# Patient Record
Sex: FEMALE | Race: WHITE | NOT HISPANIC OR LATINO | URBAN - METROPOLITAN AREA
[De-identification: names, ages, dates, MRNs, and addresses within clinical notes are randomized per-mention and may not be internally consistent; named-entity substitution may affect disease eponyms.]

---

## 2017-04-11 ENCOUNTER — ON CAMPUS - OUTPATIENT (AMBULATORY)
Dept: URBAN - METROPOLITAN AREA HOSPITAL 85 | Facility: HOSPITAL | Age: 57
End: 2017-04-11

## 2017-04-11 ENCOUNTER — HOSPITAL ENCOUNTER (OUTPATIENT)
Dept: PREOP | Facility: HOSPITAL | Age: 57
Setting detail: HOSPITAL OUTPATIENT SURGERY
Discharge: HOME | End: 2017-04-11
Attending: INTERNAL MEDICINE | Admitting: INTERNAL MEDICINE

## 2017-04-11 DIAGNOSIS — R19.7 DIARRHEA, UNSPECIFIED: ICD-10-CM

## 2017-04-11 DIAGNOSIS — R10.13 EPIGASTRIC PAIN: ICD-10-CM

## 2017-04-11 DIAGNOSIS — Z90.49 ACQUIRED ABSENCE OF OTHER SPECIFIED PARTS OF DIGESTIVE TRACT: ICD-10-CM

## 2017-04-11 DIAGNOSIS — K44.9 DIAPHRAGMATIC HERNIA WITHOUT OBSTRUCTION OR GANGRENE: ICD-10-CM

## 2017-04-11 DIAGNOSIS — R11.0 NAUSEA: ICD-10-CM

## 2017-04-11 DIAGNOSIS — D12.3 BENIGN NEOPLASM OF TRANSVERSE COLON: ICD-10-CM

## 2017-04-11 DIAGNOSIS — K29.70 GASTRITIS, UNSPECIFIED, WITHOUT BLEEDING: ICD-10-CM

## 2017-04-11 DIAGNOSIS — Z85.038 PERSONAL HISTORY OF OTHER MALIGNANT NEOPLASM OF LARGE INTEST: ICD-10-CM

## 2017-04-11 DIAGNOSIS — D12.5 BENIGN NEOPLASM OF SIGMOID COLON: ICD-10-CM

## 2017-04-11 PROCEDURE — 43239 EGD BIOPSY SINGLE/MULTIPLE: CPT | Performed by: INTERNAL MEDICINE

## 2017-04-11 PROCEDURE — 45385 COLONOSCOPY W/LESION REMOVAL: CPT | Performed by: INTERNAL MEDICINE

## 2018-12-17 ENCOUNTER — HOSPITAL ENCOUNTER (OUTPATIENT)
Dept: ORTHOPEDIC SURGERY | Facility: CLINIC | Age: 58
Discharge: HOME | End: 2018-12-17
Attending: NEUROLOGICAL SURGERY | Admitting: NEUROLOGICAL SURGERY

## 2018-12-20 ENCOUNTER — HOSPITAL ENCOUNTER (OUTPATIENT)
Dept: PREADMISSION TESTING | Facility: HOSPITAL | Age: 58
Discharge: HOME | End: 2018-12-20
Attending: NEUROLOGICAL SURGERY | Admitting: NEUROLOGICAL SURGERY

## 2018-12-20 LAB
ABO + RH BLD: NORMAL
ANION GAP SERPL CALC-SCNC: 14.5 MMOL/L (ref 10–20)
APTT BLD: 26.5 SEC (ref 24–31)
ARMBAND: NORMAL
BASOPHILS # BLD AUTO: 0.1 10*3/UL (ref 0–0.2)
BASOPHILS NFR BLD AUTO: 1 % (ref 0–2)
BILIRUB UR QL STRIP: NEGATIVE MG/DL
BLD COMPONENT TYPE: NORMAL
BLD GP AB SCN SERPL QL: NEGATIVE
BUN SERPL-MCNC: 5 MG/DL (ref 8–20)
BUN/CREAT SERPL: 7.1 (ref 5.4–26.2)
CALCIUM SERPL-MCNC: 9.1 MG/DL (ref 8.9–10.3)
CASTS URNS QL MICRO: ABNORMAL /[LPF]
CHLORIDE SERPL-SCNC: 95 MMOL/L (ref 101–111)
COLOR UR: YELLOW
CONV BACTERIA IN URINE MICRO: NEGATIVE
CONV CLARITY OF URINE: CLEAR
CONV CO2: 28 MMOL/L (ref 22–32)
CONV HYALINE CASTS IN URINE MICRO: 0 /[LPF] (ref 0–5)
CONV PROTEIN IN URINE BY AUTOMATED TEST STRIP: NEGATIVE MG/DL
CONV SMALL ROUND CELLS: ABNORMAL /[HPF]
CONV UROBILINOGEN IN URINE BY AUTOMATED TEST STRIP: 0.2 MG/DL
CREAT UR-MCNC: 0.7 MG/DL (ref 0.4–1)
CROSSMATCH EXPIRATION: NORMAL
CULTURE INDICATED?: ABNORMAL
DIFFERENTIAL METHOD BLD: (no result)
EOSINOPHIL # BLD AUTO: 0.1 10*3/UL (ref 0–0.3)
EOSINOPHIL # BLD AUTO: 2 % (ref 0–3)
ERYTHROCYTE [DISTWIDTH] IN BLOOD BY AUTOMATED COUNT: 15.2 % (ref 11.5–14.5)
GLUCOSE SERPL-MCNC: 92 MG/DL (ref 65–99)
GLUCOSE UR QL: NEGATIVE MG/DL
HCT VFR BLD AUTO: 48.6 % (ref 35–49)
HGB BLD-MCNC: 16.1 G/DL (ref 12–15)
HGB UR QL STRIP: ABNORMAL
INR PPP: 1
KETONES UR QL STRIP: NEGATIVE MG/DL
LEUKOCYTE ESTERASE UR QL STRIP: NEGATIVE
LYMPHOCYTES # BLD AUTO: 1.6 10*3/UL (ref 0.8–4.8)
LYMPHOCYTES NFR BLD AUTO: 29 % (ref 18–42)
MCH RBC QN AUTO: 31.8 PG (ref 26–32)
MCHC RBC AUTO-ENTMCNC: 33.2 G/DL (ref 32–36)
MCV RBC AUTO: 95.9 FL (ref 80–94)
MICRO REPORT STATUS: NORMAL
MONOCYTES # BLD AUTO: 0.5 10*3/UL (ref 0.1–1.3)
MONOCYTES NFR BLD AUTO: 9 % (ref 2–11)
NEUTROPHILS # BLD AUTO: 3.2 10*3/UL (ref 2.3–8.6)
NEUTROPHILS NFR BLD AUTO: 59 % (ref 50–75)
NITRITE UR QL STRIP: NEGATIVE
NRBC BLD AUTO-RTO: 0 /100{WBCS}
NRBC/RBC NFR BLD MANUAL: 0 10*3/UL
PH UR STRIP.AUTO: 7 [PH] (ref 4.5–8)
PLATELET # BLD AUTO: 315 10*3/UL (ref 150–450)
PMV BLD AUTO: 6.7 FL (ref 7.4–10.4)
POTASSIUM SERPL-SCNC: 4.5 MMOL/L (ref 3.6–5.1)
PROTHROMBIN TIME: 9.9 SEC (ref 9.6–11.7)
RBC # BLD AUTO: 5.07 10*6/UL (ref 4–5.4)
RBC #/AREA URNS HPF: 3 /[HPF] (ref 0–3)
SODIUM SERPL-SCNC: 133 MMOL/L (ref 136–144)
SP GR UR: 1.01 (ref 1–1.03)
SPECIMEN SOURCE: ABNORMAL
SPERM URNS QL MICRO: ABNORMAL /[HPF]
SQUAMOUS SPT QL MICRO: 1 /[HPF] (ref 0–5)
UNIDENT CRYS URNS QL MICRO: ABNORMAL /[HPF]
WBC # BLD AUTO: 5.6 10*3/UL (ref 4.5–11.5)
WBC #/AREA URNS HPF: 1 /[HPF] (ref 0–5)
YEAST SPEC QL WET PREP: ABNORMAL /[HPF]

## 2018-12-26 ENCOUNTER — HOSPITAL ENCOUNTER (OUTPATIENT)
Dept: PREOP | Facility: HOSPITAL | Age: 58
Setting detail: HOSPITAL OUTPATIENT SURGERY
Discharge: HOME | End: 2018-12-26
Attending: NEUROLOGICAL SURGERY | Admitting: NEUROLOGICAL SURGERY

## 2020-06-09 ENCOUNTER — HOSPITAL ENCOUNTER (INPATIENT)
Facility: HOSPITAL | Age: 60
LOS: 2 days | Discharge: HOME | End: 2020-06-13
Attending: EMERGENCY MEDICINE | Admitting: FAMILY MEDICINE

## 2020-06-09 ENCOUNTER — APPOINTMENT (OUTPATIENT)
Dept: GENERAL RADIOLOGY | Facility: HOSPITAL | Age: 60
End: 2020-06-09

## 2020-06-09 DIAGNOSIS — R94.39 ABNORMAL NUCLEAR STRESS TEST: ICD-10-CM

## 2020-06-09 DIAGNOSIS — I20.8 ANGINA AT REST (HCC): ICD-10-CM

## 2020-06-09 DIAGNOSIS — R06.00 DYSPNEA, UNSPECIFIED TYPE: Primary | ICD-10-CM

## 2020-06-09 DIAGNOSIS — J44.1 COPD EXACERBATION (HCC): ICD-10-CM

## 2020-06-09 PROBLEM — J96.21 ACUTE ON CHRONIC RESPIRATORY FAILURE WITH HYPOXIA: Status: ACTIVE | Noted: 2020-06-09

## 2020-06-09 LAB
ANION GAP SERPL CALCULATED.3IONS-SCNC: 11 MMOL/L (ref 5–15)
BASOPHILS # BLD AUTO: 0 10*3/MM3 (ref 0–0.2)
BASOPHILS NFR BLD AUTO: 0.7 % (ref 0–1.5)
BUN BLD-MCNC: 12 MG/DL (ref 8–23)
BUN BLD-MCNC: ABNORMAL MG/DL
BUN/CREAT SERPL: ABNORMAL
CALCIUM SPEC-SCNC: 8.8 MG/DL (ref 8.6–10.5)
CHLORIDE SERPL-SCNC: 83 MMOL/L (ref 98–107)
CO2 SERPL-SCNC: 33 MMOL/L (ref 22–29)
CREAT BLD-MCNC: 0.53 MG/DL (ref 0.57–1)
DEPRECATED RDW RBC AUTO: 45.5 FL (ref 37–54)
EOSINOPHIL # BLD AUTO: 0.1 10*3/MM3 (ref 0–0.4)
EOSINOPHIL NFR BLD AUTO: 1.4 % (ref 0.3–6.2)
ERYTHROCYTE [DISTWIDTH] IN BLOOD BY AUTOMATED COUNT: 14.7 % (ref 12.3–15.4)
GFR SERPL CREATININE-BSD FRML MDRD: 117 ML/MIN/1.73
GLUCOSE BLD-MCNC: 100 MG/DL (ref 65–99)
HCT VFR BLD AUTO: 51.1 % (ref 34–46.6)
HGB BLD-MCNC: 17.5 G/DL (ref 12–15.9)
LYMPHOCYTES # BLD AUTO: 1.3 10*3/MM3 (ref 0.7–3.1)
LYMPHOCYTES NFR BLD AUTO: 25.3 % (ref 19.6–45.3)
MCH RBC QN AUTO: 31 PG (ref 26.6–33)
MCHC RBC AUTO-ENTMCNC: 34.3 G/DL (ref 31.5–35.7)
MCV RBC AUTO: 90.4 FL (ref 79–97)
MONOCYTES # BLD AUTO: 0.8 10*3/MM3 (ref 0.1–0.9)
MONOCYTES NFR BLD AUTO: 15.3 % (ref 5–12)
NEUTROPHILS # BLD AUTO: 3 10*3/MM3 (ref 1.7–7)
NEUTROPHILS NFR BLD AUTO: 57.3 % (ref 42.7–76)
NRBC BLD AUTO-RTO: 0.1 /100 WBC (ref 0–0.2)
PLATELET # BLD AUTO: 233 10*3/MM3 (ref 140–450)
PMV BLD AUTO: 6.9 FL (ref 6–12)
POTASSIUM BLD-SCNC: 4 MMOL/L (ref 3.5–5.2)
RBC # BLD AUTO: 5.66 10*6/MM3 (ref 3.77–5.28)
SODIUM BLD-SCNC: 127 MMOL/L (ref 136–145)
WBC NRBC COR # BLD: 5.3 10*3/MM3 (ref 3.4–10.8)

## 2020-06-09 PROCEDURE — 25010000002 METHYLPREDNISOLONE PER 125 MG: Performed by: EMERGENCY MEDICINE

## 2020-06-09 PROCEDURE — 82803 BLOOD GASES ANY COMBINATION: CPT

## 2020-06-09 PROCEDURE — 99219 PR INITIAL OBSERVATION CARE/DAY 50 MINUTES: CPT | Performed by: NURSE PRACTITIONER

## 2020-06-09 PROCEDURE — 94799 UNLISTED PULMONARY SVC/PX: CPT

## 2020-06-09 PROCEDURE — G0378 HOSPITAL OBSERVATION PER HR: HCPCS

## 2020-06-09 PROCEDURE — 99284 EMERGENCY DEPT VISIT MOD MDM: CPT

## 2020-06-09 PROCEDURE — 71045 X-RAY EXAM CHEST 1 VIEW: CPT

## 2020-06-09 PROCEDURE — 80048 BASIC METABOLIC PNL TOTAL CA: CPT | Performed by: EMERGENCY MEDICINE

## 2020-06-09 PROCEDURE — 85025 COMPLETE CBC W/AUTO DIFF WBC: CPT | Performed by: EMERGENCY MEDICINE

## 2020-06-09 PROCEDURE — 36600 WITHDRAWAL OF ARTERIAL BLOOD: CPT

## 2020-06-09 PROCEDURE — 93005 ELECTROCARDIOGRAM TRACING: CPT | Performed by: EMERGENCY MEDICINE

## 2020-06-09 PROCEDURE — 94640 AIRWAY INHALATION TREATMENT: CPT

## 2020-06-09 RX ORDER — DICYCLOMINE HCL 20 MG
20 TABLET ORAL EVERY 6 HOURS
COMMUNITY

## 2020-06-09 RX ORDER — IPRATROPIUM BROMIDE AND ALBUTEROL SULFATE 2.5; .5 MG/3ML; MG/3ML
3 SOLUTION RESPIRATORY (INHALATION) ONCE
Status: COMPLETED | OUTPATIENT
Start: 2020-06-09 | End: 2020-06-09

## 2020-06-09 RX ORDER — ATORVASTATIN CALCIUM 20 MG/1
20 TABLET, FILM COATED ORAL DAILY
COMMUNITY

## 2020-06-09 RX ORDER — IPRATROPIUM BROMIDE AND ALBUTEROL SULFATE 2.5; .5 MG/3ML; MG/3ML
3 SOLUTION RESPIRATORY (INHALATION) EVERY 4 HOURS PRN
COMMUNITY

## 2020-06-09 RX ORDER — MIRTAZAPINE 30 MG/1
30 TABLET, FILM COATED ORAL NIGHTLY
COMMUNITY

## 2020-06-09 RX ORDER — SODIUM CHLORIDE 0.9 % (FLUSH) 0.9 %
10 SYRINGE (ML) INJECTION AS NEEDED
Status: DISCONTINUED | OUTPATIENT
Start: 2020-06-09 | End: 2020-06-13 | Stop reason: HOSPADM

## 2020-06-09 RX ORDER — GUAIFENESIN 600 MG/1
1200 TABLET, EXTENDED RELEASE ORAL EVERY 12 HOURS
Status: DISCONTINUED | OUTPATIENT
Start: 2020-06-09 | End: 2020-06-13 | Stop reason: HOSPADM

## 2020-06-09 RX ORDER — MELOXICAM 15 MG/1
15 TABLET ORAL DAILY
COMMUNITY
End: 2020-06-13 | Stop reason: HOSPADM

## 2020-06-09 RX ORDER — PAROXETINE HYDROCHLORIDE 20 MG/1
20 TABLET, FILM COATED ORAL EVERY MORNING
COMMUNITY

## 2020-06-09 RX ORDER — IPRATROPIUM BROMIDE AND ALBUTEROL SULFATE 2.5; .5 MG/3ML; MG/3ML
3 SOLUTION RESPIRATORY (INHALATION)
Status: DISCONTINUED | OUTPATIENT
Start: 2020-06-09 | End: 2020-06-13 | Stop reason: HOSPADM

## 2020-06-09 RX ORDER — ESTRADIOL 1 MG/1
1 TABLET ORAL DAILY
COMMUNITY

## 2020-06-09 RX ORDER — METHYLPREDNISOLONE SODIUM SUCCINATE 125 MG/2ML
125 INJECTION, POWDER, LYOPHILIZED, FOR SOLUTION INTRAMUSCULAR; INTRAVENOUS ONCE
Status: COMPLETED | OUTPATIENT
Start: 2020-06-09 | End: 2020-06-09

## 2020-06-09 RX ORDER — METHYLPREDNISOLONE SODIUM SUCCINATE 40 MG/ML
40 INJECTION, POWDER, LYOPHILIZED, FOR SOLUTION INTRAMUSCULAR; INTRAVENOUS EVERY 6 HOURS
Status: DISCONTINUED | OUTPATIENT
Start: 2020-06-10 | End: 2020-06-10

## 2020-06-09 RX ORDER — GUAIFENESIN/DEXTROMETHORPHAN 100-10MG/5
10 SYRUP ORAL EVERY 6 HOURS PRN
Status: DISCONTINUED | OUTPATIENT
Start: 2020-06-09 | End: 2020-06-13 | Stop reason: HOSPADM

## 2020-06-09 RX ORDER — BUDESONIDE 0.5 MG/2ML
0.5 INHALANT ORAL
Status: DISCONTINUED | OUTPATIENT
Start: 2020-06-09 | End: 2020-06-13 | Stop reason: HOSPADM

## 2020-06-09 RX ORDER — ALBUTEROL SULFATE 90 UG/1
2 AEROSOL, METERED RESPIRATORY (INHALATION) EVERY 4 HOURS PRN
COMMUNITY

## 2020-06-09 RX ADMIN — GUAIFENESIN 1200 MG: 600 TABLET, EXTENDED RELEASE ORAL at 23:01

## 2020-06-09 RX ADMIN — IPRATROPIUM BROMIDE AND ALBUTEROL SULFATE 3 ML: .5; 3 SOLUTION RESPIRATORY (INHALATION) at 21:06

## 2020-06-09 RX ADMIN — IPRATROPIUM BROMIDE AND ALBUTEROL SULFATE 3 ML: .5; 3 SOLUTION RESPIRATORY (INHALATION) at 23:47

## 2020-06-09 RX ADMIN — METHYLPREDNISOLONE SODIUM SUCCINATE 125 MG: 125 INJECTION, POWDER, FOR SOLUTION INTRAMUSCULAR; INTRAVENOUS at 20:35

## 2020-06-09 RX ADMIN — BUDESONIDE 0.5 MG: 0.5 INHALANT RESPIRATORY (INHALATION) at 23:47

## 2020-06-10 ENCOUNTER — APPOINTMENT (OUTPATIENT)
Dept: CT IMAGING | Facility: HOSPITAL | Age: 60
End: 2020-06-10

## 2020-06-10 LAB
ANION GAP SERPL CALCULATED.3IONS-SCNC: 13 MMOL/L (ref 5–15)
ARTERIAL PATENCY WRIST A: POSITIVE
ATMOSPHERIC PRESS: ABNORMAL MM[HG]
BASE EXCESS BLDA CALC-SCNC: 8.7 MMOL/L (ref 0–3)
BDY SITE: ABNORMAL
BUN BLD-MCNC: 11 MG/DL (ref 8–23)
BUN BLD-MCNC: ABNORMAL MG/DL
BUN/CREAT SERPL: ABNORMAL
CALCIUM SPEC-SCNC: 9.1 MG/DL (ref 8.6–10.5)
CHLORIDE SERPL-SCNC: 84 MMOL/L (ref 98–107)
CO2 BLDA-SCNC: 37.7 MMOL/L (ref 22–29)
CO2 SERPL-SCNC: 36 MMOL/L (ref 22–29)
CREAT BLD-MCNC: 0.57 MG/DL (ref 0.57–1)
GFR SERPL CREATININE-BSD FRML MDRD: 108 ML/MIN/1.73
GLUCOSE BLD-MCNC: 244 MG/DL (ref 65–99)
HCO3 BLDA-SCNC: 36 MMOL/L (ref 21–28)
HEMODILUTION: NO
HOROWITZ INDEX BLD+IHG-RTO: 28 %
MODALITY: ABNORMAL
PCO2 BLDA: 55 MM HG (ref 35–48)
PH BLDA: 7.42 PH UNITS (ref 7.35–7.45)
PO2 BLDA: 60.5 MM HG (ref 83–108)
POTASSIUM BLD-SCNC: 3.9 MMOL/L (ref 3.5–5.2)
SAO2 % BLDCOA: 90.6 % (ref 94–98)
SODIUM BLD-SCNC: 133 MMOL/L (ref 136–145)
TROPONIN T SERPL-MCNC: <0.01 NG/ML (ref 0–0.03)
TROPONIN T SERPL-MCNC: <0.01 NG/ML (ref 0–0.03)

## 2020-06-10 PROCEDURE — 71275 CT ANGIOGRAPHY CHEST: CPT

## 2020-06-10 PROCEDURE — 84484 ASSAY OF TROPONIN QUANT: CPT | Performed by: FAMILY MEDICINE

## 2020-06-10 PROCEDURE — 94640 AIRWAY INHALATION TREATMENT: CPT

## 2020-06-10 PROCEDURE — 99226 PR SBSQ OBSERVATION CARE/DAY 35 MINUTES: CPT | Performed by: FAMILY MEDICINE

## 2020-06-10 PROCEDURE — 80048 BASIC METABOLIC PNL TOTAL CA: CPT | Performed by: FAMILY MEDICINE

## 2020-06-10 PROCEDURE — G0378 HOSPITAL OBSERVATION PER HR: HCPCS

## 2020-06-10 PROCEDURE — 94799 UNLISTED PULMONARY SVC/PX: CPT

## 2020-06-10 PROCEDURE — 84484 ASSAY OF TROPONIN QUANT: CPT | Performed by: NURSE PRACTITIONER

## 2020-06-10 PROCEDURE — 25010000002 METHYLPREDNISOLONE PER 40 MG: Performed by: NURSE PRACTITIONER

## 2020-06-10 PROCEDURE — 0 IOPAMIDOL PER 1 ML: Performed by: FAMILY MEDICINE

## 2020-06-10 RX ORDER — ALBUTEROL SULFATE 2.5 MG/3ML
2.5 SOLUTION RESPIRATORY (INHALATION) EVERY 4 HOURS PRN
Status: DISCONTINUED | OUTPATIENT
Start: 2020-06-10 | End: 2020-06-13 | Stop reason: HOSPADM

## 2020-06-10 RX ORDER — MELOXICAM 15 MG/1
15 TABLET ORAL DAILY
Status: DISCONTINUED | OUTPATIENT
Start: 2020-06-10 | End: 2020-06-10

## 2020-06-10 RX ORDER — PREDNISONE 20 MG/1
40 TABLET ORAL
Status: DISCONTINUED | OUTPATIENT
Start: 2020-06-11 | End: 2020-06-13 | Stop reason: HOSPADM

## 2020-06-10 RX ORDER — DICYCLOMINE HYDROCHLORIDE 10 MG/1
10 CAPSULE ORAL 4 TIMES DAILY
Status: DISCONTINUED | OUTPATIENT
Start: 2020-06-10 | End: 2020-06-13 | Stop reason: HOSPADM

## 2020-06-10 RX ORDER — IPRATROPIUM BROMIDE AND ALBUTEROL SULFATE 2.5; .5 MG/3ML; MG/3ML
3 SOLUTION RESPIRATORY (INHALATION) EVERY 4 HOURS PRN
Status: DISCONTINUED | OUTPATIENT
Start: 2020-06-10 | End: 2020-06-13 | Stop reason: HOSPADM

## 2020-06-10 RX ORDER — ATORVASTATIN CALCIUM 20 MG/1
20 TABLET, FILM COATED ORAL DAILY
Status: DISCONTINUED | OUTPATIENT
Start: 2020-06-10 | End: 2020-06-13 | Stop reason: HOSPADM

## 2020-06-10 RX ORDER — HYDROXYZINE HYDROCHLORIDE 25 MG/1
50 TABLET, FILM COATED ORAL 3 TIMES DAILY PRN
Status: DISCONTINUED | OUTPATIENT
Start: 2020-06-10 | End: 2020-06-13 | Stop reason: HOSPADM

## 2020-06-10 RX ORDER — ESTRADIOL 1 MG/1
1 TABLET ORAL DAILY
Status: DISCONTINUED | OUTPATIENT
Start: 2020-06-10 | End: 2020-06-10

## 2020-06-10 RX ORDER — PAROXETINE HYDROCHLORIDE 20 MG/1
20 TABLET, FILM COATED ORAL EVERY MORNING
Status: DISCONTINUED | OUTPATIENT
Start: 2020-06-10 | End: 2020-06-13 | Stop reason: HOSPADM

## 2020-06-10 RX ORDER — MIRTAZAPINE 15 MG/1
30 TABLET, FILM COATED ORAL NIGHTLY
Status: DISCONTINUED | OUTPATIENT
Start: 2020-06-10 | End: 2020-06-13 | Stop reason: HOSPADM

## 2020-06-10 RX ADMIN — PAROXETINE HYDROCHLORIDE 20 MG: 20 TABLET, FILM COATED ORAL at 06:04

## 2020-06-10 RX ADMIN — HYDROXYZINE HYDROCHLORIDE 50 MG: 25 TABLET, FILM COATED ORAL at 20:09

## 2020-06-10 RX ADMIN — MIRTAZAPINE 30 MG: 15 TABLET, FILM COATED ORAL at 20:20

## 2020-06-10 RX ADMIN — GUAIFENESIN 1200 MG: 600 TABLET, EXTENDED RELEASE ORAL at 22:53

## 2020-06-10 RX ADMIN — MIRTAZAPINE 30 MG: 15 TABLET, FILM COATED ORAL at 02:11

## 2020-06-10 RX ADMIN — IPRATROPIUM BROMIDE AND ALBUTEROL SULFATE 3 ML: .5; 3 SOLUTION RESPIRATORY (INHALATION) at 10:20

## 2020-06-10 RX ADMIN — IPRATROPIUM BROMIDE AND ALBUTEROL SULFATE 3 ML: .5; 3 SOLUTION RESPIRATORY (INHALATION) at 15:00

## 2020-06-10 RX ADMIN — DICYCLOMINE HYDROCHLORIDE 10 MG: 10 CAPSULE ORAL at 08:07

## 2020-06-10 RX ADMIN — IPRATROPIUM BROMIDE AND ALBUTEROL SULFATE 3 ML: .5; 3 SOLUTION RESPIRATORY (INHALATION) at 18:54

## 2020-06-10 RX ADMIN — METOPROLOL TARTRATE 25 MG: 25 TABLET, FILM COATED ORAL at 08:07

## 2020-06-10 RX ADMIN — GUAIFENESIN 1200 MG: 600 TABLET, EXTENDED RELEASE ORAL at 11:40

## 2020-06-10 RX ADMIN — IPRATROPIUM BROMIDE AND ALBUTEROL SULFATE 3 ML: .5; 3 SOLUTION RESPIRATORY (INHALATION) at 06:50

## 2020-06-10 RX ADMIN — METHYLPREDNISOLONE SODIUM SUCCINATE 40 MG: 40 INJECTION, POWDER, FOR SOLUTION INTRAMUSCULAR; INTRAVENOUS at 08:07

## 2020-06-10 RX ADMIN — IOPAMIDOL 100 ML: 755 INJECTION, SOLUTION INTRAVENOUS at 17:30

## 2020-06-10 RX ADMIN — DICYCLOMINE HYDROCHLORIDE 10 MG: 10 CAPSULE ORAL at 20:09

## 2020-06-10 RX ADMIN — Medication 10 ML: at 08:07

## 2020-06-10 RX ADMIN — DICYCLOMINE HYDROCHLORIDE 10 MG: 10 CAPSULE ORAL at 11:40

## 2020-06-10 RX ADMIN — METHYLPREDNISOLONE SODIUM SUCCINATE 40 MG: 40 INJECTION, POWDER, FOR SOLUTION INTRAMUSCULAR; INTRAVENOUS at 13:47

## 2020-06-10 RX ADMIN — DICYCLOMINE HYDROCHLORIDE 10 MG: 10 CAPSULE ORAL at 17:48

## 2020-06-10 RX ADMIN — BUDESONIDE 0.5 MG: 0.5 INHALANT RESPIRATORY (INHALATION) at 06:50

## 2020-06-10 RX ADMIN — METHYLPREDNISOLONE SODIUM SUCCINATE 40 MG: 40 INJECTION, POWDER, FOR SOLUTION INTRAMUSCULAR; INTRAVENOUS at 02:11

## 2020-06-10 RX ADMIN — Medication 10 ML: at 13:47

## 2020-06-10 RX ADMIN — ATORVASTATIN CALCIUM 20 MG: 20 TABLET, FILM COATED ORAL at 08:07

## 2020-06-10 RX ADMIN — MELOXICAM 15 MG: 15 TABLET ORAL at 08:07

## 2020-06-10 RX ADMIN — ESTRADIOL 1 MG: 1 TABLET ORAL at 08:07

## 2020-06-10 RX ADMIN — IPRATROPIUM BROMIDE AND ALBUTEROL SULFATE 3 ML: .5; 3 SOLUTION RESPIRATORY (INHALATION) at 23:24

## 2020-06-10 RX ADMIN — Medication 10 ML: at 20:09

## 2020-06-10 RX ADMIN — BUDESONIDE 0.5 MG: 0.5 INHALANT RESPIRATORY (INHALATION) at 18:54

## 2020-06-10 RX ADMIN — GUAIFENESIN AND DEXTROMETHORPHAN 10 ML: 100; 10 SYRUP ORAL at 08:06

## 2020-06-10 NOTE — ED PROVIDER NOTES
Subjective   Patient is a 60-year-old female complaint increasing shortness of breath over the past 1 week.  She has occasional productive cough.  No fever chest pain jaw pain balm diarrhea dysuria or other associated complaints.          Review of Systems  Negative for headache ears throat fever chest pain jaw pain vomiting diarrhea dysuria is weight loss or other associated complaints.  Completely systems was obtained and is otherwise negative  Past Medical History:   Diagnosis Date   • CAD (coronary artery disease)    • Colon cancer (CMS/HCC) 04/13/2016    Colon Cancer Tumors we found on 04/13/2016   • Hyperlipidemia    • Hypertension    • Myocardial infarction (CMS/HCC)        Allergies   Allergen Reactions   • Latex Rash       Past Surgical History:   Procedure Laterality Date   • CORONARY ANGIOPLASTY      Stent Placement   • HYSTERECTOMY     • LUNG SURGERY     • NECK SURGERY     • OTHER SURGICAL HISTORY  12/26/2018    Rm 18 post op right L2-L3 microdiscectomy 12/26/2018       Family History   Problem Relation Age of Onset   • Lung disease Mother         Lung/Respiratory Disease   • Diabetes Father    • Heart disease Father    • Heart disease Sister    • Diabetes Sister        Social History     Socioeconomic History   • Marital status:      Spouse name: Not on file   • Number of children: Not on file   • Years of education: Not on file   • Highest education level: Not on file   Tobacco Use   • Smoking status: Current Every Day Smoker   • Smokeless tobacco: Never Used           Objective   Physical Exam  HEENT exam shows TMs to be clear.  Oropharynx clear moist but sclerae nonicteric.  Neck has no adenopathy JVD or bruits.  Lungs have expiratory wheezes in all lung fields..  Heart has a regular rhythm without murmur gallop.  Chest is nontender.  Abdomen is soft nontender extremity exam is no cyanosis or edema.  Procedures     My EKG interpretation shows normal sinus rhythm with no acute ST change      ED  Course      Results for orders placed or performed during the hospital encounter of 06/09/20   Basic Metabolic Panel   Result Value Ref Range    Glucose 100 (H) 65 - 99 mg/dL    BUN      Creatinine 0.53 (L) 0.57 - 1.00 mg/dL    Sodium 127 (L) 136 - 145 mmol/L    Potassium 4.0 3.5 - 5.2 mmol/L    Chloride 83 (L) 98 - 107 mmol/L    CO2 33.0 (H) 22.0 - 29.0 mmol/L    Calcium 8.8 8.6 - 10.5 mg/dL    eGFR Non African Amer 117 >60 mL/min/1.73    BUN/Creatinine Ratio      Anion Gap 11.0 5.0 - 15.0 mmol/L   CBC Auto Differential   Result Value Ref Range    WBC 5.30 3.40 - 10.80 10*3/mm3    RBC 5.66 (H) 3.77 - 5.28 10*6/mm3    Hemoglobin 17.5 (H) 12.0 - 15.9 g/dL    Hematocrit 51.1 (H) 34.0 - 46.6 %    MCV 90.4 79.0 - 97.0 fL    MCH 31.0 26.6 - 33.0 pg    MCHC 34.3 31.5 - 35.7 g/dL    RDW 14.7 12.3 - 15.4 %    RDW-SD 45.5 37.0 - 54.0 fl    MPV 6.9 6.0 - 12.0 fL    Platelets 233 140 - 450 10*3/mm3    Neutrophil % 57.3 42.7 - 76.0 %    Lymphocyte % 25.3 19.6 - 45.3 %    Monocyte % 15.3 (H) 5.0 - 12.0 %    Eosinophil % 1.4 0.3 - 6.2 %    Basophil % 0.7 0.0 - 1.5 %    Neutrophils, Absolute 3.00 1.70 - 7.00 10*3/mm3    Lymphocytes, Absolute 1.30 0.70 - 3.10 10*3/mm3    Monocytes, Absolute 0.80 0.10 - 0.90 10*3/mm3    Eosinophils, Absolute 0.10 0.00 - 0.40 10*3/mm3    Basophils, Absolute 0.00 0.00 - 0.20 10*3/mm3    nRBC 0.1 0.0 - 0.2 /100 WBC   BUN   Result Value Ref Range    BUN 12 8 - 23 mg/dL     No radiology results for the last day                                       MDM  Number of Diagnoses or Management Options  Diagnosis management comments: Patient has findings consistent with COPD exacerbation with hypoxia.  Patient did improve with breathing treatments and steroids she does have continued extra wheeze on reexam however patient will be admitted for further breathing treatment steroids and further evaluation.  I did speak the on-call hospitalist.    Risk of Complications, Morbidity, and/or Mortality  Presenting  problems: high  Diagnostic procedures: high  Management options: high    Patient Progress  Patient progress: stable      Final diagnoses:   Dyspnea, unspecified type   COPD exacerbation (CMS/[facility])            Dago Ansari MD  06/09/20 1746

## 2020-06-10 NOTE — PLAN OF CARE
Problem: Patient Care Overview  Goal: Plan of Care Review  Flowsheets  Taken 6/10/2020 0250 by Ade Melendez LPN  Progress: no change  Outcome Summary: pt was transfered to the unit from ED. she was admitted for SOB and is on 4L O2, which is new for her. pt has had minimal complaints of pain and is currently resting in bed. will continue to monitor.  Taken 6/9/2020 2306 by Kaleb Joe RN  Plan of Care Reviewed With: patient

## 2020-06-10 NOTE — PROGRESS NOTES
"      [facility] Medicine Services Daily Progress Note      Hospitalist Team  LOS 1 days      Patient Care Team:  Stanley French MD as PCP - General (Family Medicine)    Patient Location: 366/1      Subjective   Subjective     Chief Complaint / Subjective  Chief Complaint   Patient presents with   • Shortness of Breath     RA sat of 74% on EMS arrival, 94% on duoneb currnetly upon arrival      Patient reports feeling much better since initiation of nebulizers and steroids.  Breathing more comfortably and minimal coughing.  Patient reports that she has had some intermittent chest pain as well in the last week along with exertional dyspnea.  Patient has cardiologist but has not seen in multiple years.  When patient on room air after walking to bathroom she desatted into the 70s.    Brief Synopsis of Hospital Course/HPI  60 year old female with PMH COPD and continued tobacco use , CAD s/p PCI presents with complaints of increased shortness of air past few days. She denies nausea, chest pain, dizziness, fever , cough or known Covid-19 exposure. Her oxygen sats were in 70-80s in ED which improved with IV steroids , duo neb treatments and oxygen via nasal canula, CXR per radiology:     \"IMPRESSION:  Stable appearance of the chest. No active cardiopulmonary disease.  Chronic pleural thickening versus small chronic or recurrent pleural  effusions are noted.'     She is afebrile , Hgb 17.5, wbc 5.3, Na 127. EKG and troponin have been subsequently ordered and pending . She will be admitted for COPD exacerbation. PMH includes colon cancer, hyperlipidemia, depression, chronic hypertension, IBS. She denies known Covid-19 exposure .      Date::          Review of Systems   Constitution: Positive for malaise/fatigue. Negative for chills and fever.   HENT: Negative for hoarse voice and stridor.    Eyes: Negative for double vision and photophobia.   Cardiovascular: Positive for chest pain. Negative for " "irregular heartbeat and syncope.   Respiratory: Positive for cough, shortness of breath and wheezing. Negative for sputum production.    Musculoskeletal: Negative for falls and muscle weakness.   Gastrointestinal: Negative for nausea and vomiting.   Genitourinary: Negative for dysuria and flank pain.   Neurological: Negative for headaches and loss of balance.   Psychiatric/Behavioral: Negative for altered mental status. The patient is not nervous/anxious.          Objective   Objective      Vital Signs  Temp:  [97.5 °F (36.4 °C)-98.9 °F (37.2 °C)] 98.9 °F (37.2 °C)  Heart Rate:  [] 76  Resp:  [14-24] 14  BP: (105-164)/(67-96) 126/74  Oxygen Therapy  SpO2: 94 %  Pulse Oximetry Type: Intermittent  Device (Oxygen Therapy): nasal cannula  Device (Oxygen Therapy): nasal cannula  Flow (L/min): 2  Oxygen Concentration (%): 28  Flowsheet Rows      First Filed Value   Admission Height  165.1 cm (65\") Documented at 06/09/2020 2011   Admission Weight  52.2 kg (115 lb) Documented at 06/09/2020 2011        Intake & Output (last 3 days)       06/07 0701 - 06/08 0700 06/08 0701 - 06/09 0700 06/09 0701 - 06/10 0700 06/10 0701 - 06/11 0700    P.O.    400    Total Intake(mL/kg)    400 (7.7)    Net    +400                Lines, Drains & Airways    Active LDAs     Name:   Placement date:   Placement time:   Site:   Days:    Peripheral IV 06/09/20 2026 Left Antecubital   06/09/20 2026    Antecubital   less than 1                  Physical Exam:    Physical Exam    General: Slender elderly female lying in bed breathing comfortably on 2 L via nasal cannula no acute distress  HEENT: NC/AT, EOMI, PERRLA  Heart: RRR. No murmur   Chest: Normal work of breathing, poor air entry  Abdominal: Soft. NT/ND. Bowel sounds present  Musculoskeletal: Normal ROM.  No edema. No calf tenderness.  Neurological: AAOx3, no focal deficits  Skin: Skin is warm and dry. No rash  Psychiatric: Normal mood and affect.        Procedures:              Results " Review:     I reviewed the patient's new clinical results.      Lab Results (last 24 hours)     Procedure Component Value Units Date/Time    BUN [[id number]]  (Normal) Collected:  06/10/20 1153    Specimen:  Blood Updated:  06/10/20 1428     BUN 11 mg/dL     Basic Metabolic Panel [[id number]]  (Abnormal) Collected:  06/10/20 1153    Specimen:  Blood Updated:  06/10/20 1411     Glucose 244 mg/dL      BUN --     Comment: Testing performed by alternate method        Creatinine 0.57 mg/dL      Sodium 133 mmol/L      Potassium 3.9 mmol/L      Chloride 84 mmol/L      CO2 36.0 mmol/L      Calcium 9.1 mg/dL      eGFR Non African Amer 108 mL/min/1.73      BUN/Creatinine Ratio --     Comment: Testing not performed.        Anion Gap 13.0 mmol/L     Narrative:       GFR Normal >60  Chronic Kidney Disease <60  Kidney Failure <15      Troponin [[id number]]  (Normal) Collected:  06/10/20 0202    Specimen:  Blood Updated:  06/10/20 0430     Troponin T <0.010 ng/mL     Narrative:       Troponin T Reference Range:  <= 0.03 ng/mL-   Negative for AMI  >0.03 ng/mL-     Abnormal for myocardial necrosis.  Clinicians would have to utilize clinical acumen, EKG, Troponin and serial changes to determine if it is an Acute Myocardial Infarction or myocardial injury due to an underlying chronic condition.       Results may be falsely decreased if patient taking Biotin.      Basic Metabolic Panel [[id number]]  (Abnormal) Collected:  06/09/20 2029    Specimen:  Blood from Arm, Left Updated:  06/09/20 2120     Glucose 100 mg/dL      BUN --     Comment: Testing done by alternate method.        Creatinine 0.53 mg/dL      Sodium 127 mmol/L      Potassium 4.0 mmol/L      Chloride 83 mmol/L      CO2 33.0 mmol/L      Calcium 8.8 mg/dL      eGFR Non African Amer 117 mL/min/1.73      BUN/Creatinine Ratio --     Comment: Testing not performed.        Anion Gap 11.0 mmol/L     Narrative:       GFR Normal >60  Chronic Kidney Disease <60  Kidney Failure <15       BUN [[id number]]  (Normal) Collected:  06/09/20 2029    Specimen:  Blood from Arm, Left Updated:  06/09/20 2120     BUN 12 mg/dL     CBC & Differential [[id number]] Collected:  06/09/20 2029    Specimen:  Blood from Arm, Left Updated:  06/09/20 2038    Narrative:       The following orders were created for panel order CBC & Differential.  Procedure                               Abnormality         Status                     ---------                               -----------         ------                     CBC Auto Differential[[id number]]        Abnormal            Final result                 Please view results for these tests on the individual orders.    CBC Auto Differential [[id number]]  (Abnormal) Collected:  06/09/20 2029    Specimen:  Blood from Arm, Left Updated:  06/09/20 2038     WBC 5.30 10*3/mm3      RBC 5.66 10*6/mm3      Hemoglobin 17.5 g/dL      Hematocrit 51.1 %      MCV 90.4 fL      MCH 31.0 pg      MCHC 34.3 g/dL      RDW 14.7 %      RDW-SD 45.5 fl      MPV 6.9 fL      Platelets 233 10*3/mm3      Neutrophil % 57.3 %      Lymphocyte % 25.3 %      Monocyte % 15.3 %      Eosinophil % 1.4 %      Basophil % 0.7 %      Neutrophils, Absolute 3.00 10*3/mm3      Lymphocytes, Absolute 1.30 10*3/mm3      Monocytes, Absolute 0.80 10*3/mm3      Eosinophils, Absolute 0.10 10*3/mm3      Basophils, Absolute 0.00 10*3/mm3      nRBC 0.1 /100 WBC         No results found for: HGBA1C            No results found for: LIPASE  No results found for: CHOL, CHLPL, TRIG, HDL, LDL, LDLDIRECT    No results found for: INTRAOP, PREDX, FINALDX, COMDX    Microbiology Results (last 10 days)     ** No results found for the last 240 hours. **          ECG/EMG Results (most recent)     Procedure Component Value Units Date/Time    ECG 12 Lead [[id number]] Collected:  06/09/20 2024     Updated:  06/09/20 2027    Narrative:       HEART RATE= 95  bpm  RR Interval= 632  ms  SC Interval= 122  ms  P Horizontal Axis= -9  deg  P Front Axis= 82   deg  QRSD Interval= 78  ms  QT Interval= 358  ms  QRS Axis= -9  deg  T Wave Axis= 15  deg  - BORDERLINE ECG -  Sinus rhythm  Biatrial enlargement  When compared with ECG of 20-Dec-2018 10:53:52,  Significant change in rhythm  Significant axis, voltage or hypertrophy change  Electronically Signed By:   Date and Time of Study: 2020-06-09 20:24:23                    Xr Chest 1 View    Result Date: 6/9/2020  Stable appearance of the chest. No active cardiopulmonary disease. Chronic pleural thickening versus small chronic or recurrent pleural effusions are noted.   Electronically Signed By-Kyree Black DO. On:6/9/2020 10:29 PM This report was finalized on [id number] by  Kyree Black DO..          Xrays, labs reviewed personally by physician.    Medication Review:   I have reviewed the patient's current medication list      Scheduled Meds    atorvastatin 20 mg Oral Daily   budesonide 0.5 mg Nebulization BID - RT   dicyclomine 10 mg Oral 4x Daily   estradiol 1 mg Oral Daily   guaiFENesin 1,200 mg Oral Q12H   ipratropium-albuterol 3 mL Nebulization Q4H - RT   meloxicam 15 mg Oral Daily   methylPREDNISolone sodium succinate 40 mg Intravenous Q6H   metoprolol tartrate 25 mg Oral Daily   mirtazapine 30 mg Oral Nightly   PARoxetine 20 mg Oral QAM       Meds Infusions       Meds PRN  •  albuterol  •  guaiFENesin-dextromethorphan  •  ipratropium-albuterol  •  ipratropium-albuterol  •  [COMPLETED] Insert peripheral IV **AND** sodium chloride        Assessment/Plan   Assessment/Plan     Active Hospital Problems    Diagnosis  POA   • Dyspnea [R06.00]  Yes   • Acute on chronic respiratory failure with hypoxia (CMS/HCC) [J96.21]  Yes      Resolved Hospital Problems   No resolved problems to display.       MEDICAL DECISION MAKING COMPLEXITY BY PROBLEM:     Shortness of breath -may be multifactorial.  Patient's history of tobacco use and wheezing consistent with COPD exacerbation.  However patient also has cardiac history and  noted chest pain with exertional dyspnea  -Check troponins periodically  -Mucolytic  -Nebulizers  -Steroids  -No sputum present  -Stress test  -Inpatient versus outpatient cardiology follow-up  -Given history of smoking and hormone replacement keep PE on differential    Hyponatremia -possible ADH mediated stress response, improved from admission of 127  -Monitor daily  -Patient appears euvolemic    Polycythemia -secondary most likely due to smoking history  -Monitor daily    Coronary artery disease -with history of MI has not seen cardiology in multiple years  -Check troponins  -Telemetry  -Stress test in a.m.    Hypertension/depression/hormone replacement/IBS -chronic in nature  -Hold hormones  -Resume other medications clinically appropriate     VTE Prophylaxis -   Mechanical Order History:      Ordered        06/09/20 2233  Place Sequential Compression Device  Once         06/09/20 2233  Maintain Sequential Compression Device  Continuous                 Pharmalogical Order History:     None            Code Status -   Code Status and Medical Interventions:   Ordered at: 06/09/20 2154     Code Status:    CPR     Medical Interventions (Level of Support Prior to Arrest):    Full           Discharge Planning          Destination      Coordination has not been started for this encounter.      Durable Medical Equipment      Coordination has not been started for this encounter.      Dialysis/Infusion      Coordination has not been started for this encounter.      Home Medical Care      Coordination has not been started for this encounter.      Therapy      Coordination has not been started for this encounter.      Community Resources      Coordination has not been started for this encounter.            Electronically signed by Peter Eduardo MD, 06/10/20, 15:14.  [facility]ist Team

## 2020-06-10 NOTE — PROGRESS NOTES
Continued Stay Note  [facility]     Patient Name: Jocelin Wills  MRN: [id number]  Today's Date: 6/10/2020    Admit Date: 6/9/2020    Discharge Plan     Row Name 06/10/20 1127       Plan    Plan  Anticipate home with g-son. Grandson is Autistic and is there to help her ,if needed.             Expected Discharge Date and Time     Expected Discharge Date Expected Discharge Time    Jun 12, 2020             Kristi Zhou RN

## 2020-06-10 NOTE — ED NOTES
Pt reports normal diarrhea for her. She had a bowel resection a few years ago and has had diarrhea regularly since.     Maureen Lombardi RN  06/09/20 2041

## 2020-06-10 NOTE — PLAN OF CARE
No complaints. Patient scheduled to have stress test 6/11. Will continue to monitor.  Problem: Patient Care Overview  Goal: Plan of Care Review  Outcome: Ongoing (interventions implemented as appropriate)

## 2020-06-10 NOTE — H&P
"      [facility] Medicine Services      Patient Name: Jocelin Wills  : 1960  MRN: [id number]  Primary Care Physician: Stanley French MD  Date of admission: 2020    Patient Care Team:  Stanley French MD as PCP - General (Family Medicine)          Subjective   History Present Illness     Chief Complaint:   Chief Complaint   Patient presents with   • Shortness of Breath     RA sat of 74% on EMS arrival, 94% on duoneb currnetly upon arrival                 60 year old female with PMH COPD and continued tobacco use , CAD s/p PCI presents with complaints of increased shortness of air past few days. She denies nausea, chest pain, dizziness, fever , cough or known Covid-19 exposure. Her oxygen sats were in 70-80s in ED which improved with IV steroids , duo neb treatments and oxygen via nasal canula, CXR per radiology:    \"IMPRESSION:  Stable appearance of the chest. No active cardiopulmonary disease.  Chronic pleural thickening versus small chronic or recurrent pleural  effusions are noted.'    She is afebrile , Hgb 17.5, wbc 5.3, Na 127. EKG and troponin have been subsequently ordered and pending . She will be admitted for COPD exacerbation. PMH includes colon cancer, hyperlipidemia, depression, chronic hypertension, IBS. She denies known Covid-19 exposure .        Review of Systems   Constitution: Negative.   HENT: Negative.    Eyes: Negative.    Cardiovascular: Negative.    Respiratory: Positive for shortness of breath and wheezing.    Endocrine: Negative.    Hematologic/Lymphatic: Negative.    Skin: Negative.    Musculoskeletal: Negative.    Gastrointestinal: Negative.    Genitourinary: Negative.    Neurological: Negative.    Psychiatric/Behavioral: Positive for depression.   Allergic/Immunologic: Negative.            Personal History     Past Medical History:   Past Medical History:   Diagnosis Date   • CAD (coronary artery disease)    • Colon cancer (CMS/HCC) " 04/13/2016    Colon Cancer Tumors we found on 04/13/2016   • Hyperlipidemia    • Hypertension    • Myocardial infarction (CMS/HCC)        Surgical History:      Past Surgical History:   Procedure Laterality Date   • CORONARY ANGIOPLASTY      Stent Placement   • HYSTERECTOMY     • LUNG SURGERY     • NECK SURGERY     • OTHER SURGICAL HISTORY  12/26/2018    Rm 18 post op right L2-L3 microdiscectomy 12/26/2018           Family History: family history includes Diabetes in her father and sister; Heart disease in her father and sister; Lung disease in her mother. Otherwise pertinent FHx was reviewed and unremarkable.     Social History:  reports that she has been smoking. She has never used smokeless tobacco.      Medications:  Prior to Admission medications    Medication Sig Start Date End Date Taking? Authorizing Provider   albuterol sulfate  (90 Base) MCG/ACT inhaler Inhale 2 puffs Every 4 (Four) Hours As Needed for Wheezing.   Yes Vincent Stevens MD   atorvastatin (LIPITOR) 20 MG tablet Take 20 mg by mouth Daily.   Yes Vincent Stevens MD   dicyclomine (BENTYL) 20 MG tablet Take 20 mg by mouth Every 6 (Six) Hours.   Yes Vincent Stevens MD   estradiol (ESTRACE) 1 MG tablet Take 1 mg by mouth Daily.   Yes ProviderVincent MD   ipratropium-albuterol (DUO-NEB) 0.5-2.5 mg/3 ml nebulizer Take 3 mL by nebulization Every 4 (Four) Hours As Needed for Wheezing.   Yes Vincent Stevens MD   meloxicam (MOBIC) 15 MG tablet Take 15 mg by mouth Daily.   Yes Vincent Stevens MD   metoprolol tartrate (LOPRESSOR) 25 MG tablet Take 25 mg by mouth Daily.   Yes Vincent Stevens MD   mirtazapine (REMERON) 30 MG tablet Take 30 mg by mouth Every Night.   Yes Vincent Stevens MD   PARoxetine (PAXIL) 20 MG tablet Take 20 mg by mouth Every Morning.   Yes ProviderVincent MD       Allergies:    Allergies   Allergen Reactions   • Latex Rash       Objective   Objective     Vital Signs  Temp:   [98.7 °F (37.1 °C)] 98.7 °F (37.1 °C)  Heart Rate:  [] 101  Resp:  [16-24] 22  BP: (131-164)/(85-96) 148/96  SpO2:  [88 %-97 %] 88 %  on  Flow (L/min):  [2] 2;   Device (Oxygen Therapy): nasal cannula  Body mass index is 19.14 kg/m².    Physical Exam   Constitutional: She is oriented to person, place, and time. She appears well-developed and well-nourished.   HENT:   Head: Normocephalic and atraumatic.   Eyes: EOM are normal.   Neck: Normal range of motion. Neck supple.   Cardiovascular: Normal rate, regular rhythm and normal heart sounds.   Pulmonary/Chest: Effort normal and breath sounds normal.   Abdominal: Bowel sounds are normal.   Genitourinary:   Genitourinary Comments: deferred   Musculoskeletal: Normal range of motion.   Neurological: She is alert and oriented to person, place, and time.   Skin: Skin is warm and dry.   Psychiatric: She has a normal mood and affect. Her behavior is normal. Judgment and thought content normal.   Vitals reviewed.      Results Review:  I have personally reviewed most recent radiology images and interpretations and agree with findings, most notably: CXR.    Results from last 7 days   Lab Units 06/09/20 2029   WBC 10*3/mm3 5.30   HEMOGLOBIN g/dL 17.5*   HEMATOCRIT % 51.1*   PLATELETS 10*3/mm3 233     Results from last 7 days   Lab Units 06/09/20 2029   SODIUM mmol/L 127*   POTASSIUM mmol/L 4.0   CHLORIDE mmol/L 83*   CO2 mmol/L 33.0*   BUN  12   CREATININE mg/dL 0.53*   GLUCOSE mg/dL 100*   CALCIUM mg/dL 8.8     Estimated Creatinine Clearance: 91.9 mL/min (A) (by C-G formula based on SCr of 0.53 mg/dL (L)).  Brief Urine Lab Results     None          Microbiology Results (last 10 days)     ** No results found for the last 240 hours. **          ECG/EMG Results (most recent)     Procedure Component Value Units Date/Time    ECG 12 Lead [[id number]] Collected:  06/09/20 2024     Updated:  06/09/20 2027    Narrative:       HEART RATE= 95  bpm  RR Interval= 632  ms  AL Interval=  122  ms  P Horizontal Axis= -9  deg  P Front Axis= 82  deg  QRSD Interval= 78  ms  QT Interval= 358  ms  QRS Axis= -9  deg  T Wave Axis= 15  deg  - BORDERLINE ECG -  Sinus rhythm  Biatrial enlargement  When compared with ECG of 20-Dec-2018 10:53:52,  Significant change in rhythm  Significant axis, voltage or hypertrophy change  Electronically Signed By:   Date and Time of Study: 2020-06-09 20:24:23                    No radiology results for the last 7 days      Estimated Creatinine Clearance: 91.9 mL/min (A) (by C-G formula based on SCr of 0.53 mg/dL (L)).    Assessment/Plan   Assessment/Plan       Active Hospital Problems    Diagnosis  POA   • Dyspnea [R06.00]  Yes      Resolved Hospital Problems   No resolved problems to display.     Dyspnea/ acute resp failure with hypoxia  - likely secondary to COPD exacerbation with continued tobacco use - now improved , continue supplemental oxygen via nasal canula, duo nebs and IV steroids, continue home , albuterol, not on home oxygen , encourage smoking cessation, wbc normal no signs infectious process, afebrile     CAD s/p MI CADs/p PCI- denies chest pain troponin and ekg ordered and pending given PMH, on statin and BB    Essential htn - on metoprolol, monitor     Depression - on mirtazapine and Paxil     HRT- on estradiol    IBS- on Bentyl    Chronic pain - on meloxicam           VTE Prophylaxis -   Mechanical Order History:      Ordered        Signed and Held  Place Sequential Compression Device  Once         Signed and Held  Maintain Sequential Compression Device  Continuous                 Pharmalogical Order History:     None          CODE STATUS:    Code Status and Medical Interventions:   Ordered at: 06/09/20 9222     Code Status:    CPR     Medical Interventions (Level of Support Prior to Arrest):    Full       This patient has been examined wearing appropriate Personal Protective Equipment . 06/09/20      I discussed the patient's findings and my recommendations  with patient.        Electronically signed by TOMER Lomeli, 06/09/20, 9:54 PM.  [facility] Hospitalist Team

## 2020-06-11 ENCOUNTER — APPOINTMENT (OUTPATIENT)
Dept: NUCLEAR MEDICINE | Facility: HOSPITAL | Age: 60
End: 2020-06-11

## 2020-06-11 ENCOUNTER — HOSPITAL ENCOUNTER (OUTPATIENT)
Facility: HOSPITAL | Age: 60
Setting detail: HOSPITAL OUTPATIENT SURGERY
End: 2020-06-11
Attending: INTERNAL MEDICINE | Admitting: INTERNAL MEDICINE

## 2020-06-11 DIAGNOSIS — I20.8 ANGINA AT REST (HCC): ICD-10-CM

## 2020-06-11 DIAGNOSIS — R94.39 ABNORMAL NUCLEAR STRESS TEST: ICD-10-CM

## 2020-06-11 PROBLEM — I20.89 ANGINA AT REST: Status: ACTIVE | Noted: 2020-06-11

## 2020-06-11 LAB
ANION GAP SERPL CALCULATED.3IONS-SCNC: 5 MMOL/L (ref 5–15)
ANION GAP SERPL CALCULATED.3IONS-SCNC: 7 MMOL/L (ref 5–15)
BASOPHILS # BLD AUTO: 0 10*3/MM3 (ref 0–0.2)
BASOPHILS # BLD AUTO: 0 10*3/MM3 (ref 0–0.2)
BASOPHILS NFR BLD AUTO: 0.1 % (ref 0–1.5)
BASOPHILS NFR BLD AUTO: 0.9 % (ref 0–1.5)
BH CV NUCLEAR PRIOR STUDY: 3
BH CV STRESS BP STAGE 1: NORMAL
BH CV STRESS BP STAGE 2: NORMAL
BH CV STRESS COMMENTS STAGE 1: NORMAL
BH CV STRESS COMMENTS STAGE 2: NORMAL
BH CV STRESS DOSE REGADENOSON STAGE 1: 0.4
BH CV STRESS DURATION MIN STAGE 1: 0
BH CV STRESS DURATION MIN STAGE 2: 4
BH CV STRESS DURATION SEC STAGE 1: 10
BH CV STRESS DURATION SEC STAGE 2: 0
BH CV STRESS HR STAGE 1: 86
BH CV STRESS HR STAGE 2: 97
BH CV STRESS PROTOCOL 1: NORMAL
BH CV STRESS RECOVERY BP: NORMAL MMHG
BH CV STRESS RECOVERY HR: 89 BPM
BH CV STRESS STAGE 1: 1
BH CV STRESS STAGE 2: 2
BUN BLD-MCNC: 14 MG/DL (ref 8–23)
BUN BLD-MCNC: ABNORMAL MG/DL
BUN BLD-MCNC: ABNORMAL MG/DL
BUN/CREAT SERPL: ABNORMAL
BUN/CREAT SERPL: ABNORMAL
CALCIUM SPEC-SCNC: 8.9 MG/DL (ref 8.6–10.5)
CALCIUM SPEC-SCNC: 9.6 MG/DL (ref 8.6–10.5)
CHLORIDE SERPL-SCNC: 91 MMOL/L (ref 98–107)
CHLORIDE SERPL-SCNC: 95 MMOL/L (ref 98–107)
CO2 SERPL-SCNC: 37 MMOL/L (ref 22–29)
CO2 SERPL-SCNC: 40 MMOL/L (ref 22–29)
CREAT BLD-MCNC: 0.55 MG/DL (ref 0.57–1)
CREAT BLD-MCNC: 0.62 MG/DL (ref 0.57–1)
DEPRECATED RDW RBC AUTO: 47.7 FL (ref 37–54)
DEPRECATED RDW RBC AUTO: 48.1 FL (ref 37–54)
EOSINOPHIL # BLD AUTO: 0 10*3/MM3 (ref 0–0.4)
EOSINOPHIL # BLD AUTO: 0 10*3/MM3 (ref 0–0.4)
EOSINOPHIL NFR BLD AUTO: 0 % (ref 0.3–6.2)
EOSINOPHIL NFR BLD AUTO: 0 % (ref 0.3–6.2)
ERYTHROCYTE [DISTWIDTH] IN BLOOD BY AUTOMATED COUNT: 14.8 % (ref 12.3–15.4)
ERYTHROCYTE [DISTWIDTH] IN BLOOD BY AUTOMATED COUNT: 14.9 % (ref 12.3–15.4)
GFR SERPL CREATININE-BSD FRML MDRD: 112 ML/MIN/1.73
GFR SERPL CREATININE-BSD FRML MDRD: 98 ML/MIN/1.73
GLUCOSE BLD-MCNC: 104 MG/DL (ref 65–99)
GLUCOSE BLD-MCNC: 188 MG/DL (ref 65–99)
HCT VFR BLD AUTO: 46.3 % (ref 34–46.6)
HCT VFR BLD AUTO: 50.2 % (ref 34–46.6)
HGB BLD-MCNC: 15.5 G/DL (ref 12–15.9)
HGB BLD-MCNC: 16.5 G/DL (ref 12–15.9)
LV EF NUC BP: 50 %
LYMPHOCYTES # BLD AUTO: 0.4 10*3/MM3 (ref 0.7–3.1)
LYMPHOCYTES # BLD AUTO: 0.9 10*3/MM3 (ref 0.7–3.1)
LYMPHOCYTES NFR BLD AUTO: 17.5 % (ref 19.6–45.3)
LYMPHOCYTES NFR BLD AUTO: 7.6 % (ref 19.6–45.3)
MAGNESIUM SERPL-MCNC: 1.6 MG/DL (ref 1.6–2.4)
MAXIMAL PREDICTED HEART RATE: 159 BPM
MCH RBC QN AUTO: 30.2 PG (ref 26.6–33)
MCH RBC QN AUTO: 31.2 PG (ref 26.6–33)
MCHC RBC AUTO-ENTMCNC: 32.9 G/DL (ref 31.5–35.7)
MCHC RBC AUTO-ENTMCNC: 33.6 G/DL (ref 31.5–35.7)
MCV RBC AUTO: 92 FL (ref 79–97)
MCV RBC AUTO: 92.9 FL (ref 79–97)
MONOCYTES # BLD AUTO: 0.5 10*3/MM3 (ref 0.1–0.9)
MONOCYTES # BLD AUTO: 0.8 10*3/MM3 (ref 0.1–0.9)
MONOCYTES NFR BLD AUTO: 14.4 % (ref 5–12)
MONOCYTES NFR BLD AUTO: 8.8 % (ref 5–12)
NEUTROPHILS # BLD AUTO: 3.5 10*3/MM3 (ref 1.7–7)
NEUTROPHILS # BLD AUTO: 4.6 10*3/MM3 (ref 1.7–7)
NEUTROPHILS NFR BLD AUTO: 67.2 % (ref 42.7–76)
NEUTROPHILS NFR BLD AUTO: 83.5 % (ref 42.7–76)
NRBC BLD AUTO-RTO: 0 /100 WBC (ref 0–0.2)
NRBC BLD AUTO-RTO: 0.1 /100 WBC (ref 0–0.2)
PERCENT MAX PREDICTED HR: 61.01 %
PLATELET # BLD AUTO: 243 10*3/MM3 (ref 140–450)
PLATELET # BLD AUTO: 249 10*3/MM3 (ref 140–450)
PMV BLD AUTO: 6.9 FL (ref 6–12)
PMV BLD AUTO: 6.9 FL (ref 6–12)
POTASSIUM BLD-SCNC: 4.4 MMOL/L (ref 3.5–5.2)
POTASSIUM BLD-SCNC: 4.5 MMOL/L (ref 3.5–5.2)
RBC # BLD AUTO: 4.98 10*6/MM3 (ref 3.77–5.28)
RBC # BLD AUTO: 5.46 10*6/MM3 (ref 3.77–5.28)
SODIUM BLD-SCNC: 136 MMOL/L (ref 136–145)
SODIUM BLD-SCNC: 139 MMOL/L (ref 136–145)
STRESS BASELINE BP: NORMAL MMHG
STRESS BASELINE HR: 86 BPM
STRESS PERCENT HR: 72 %
STRESS POST PEAK BP: NORMAL MMHG
STRESS POST PEAK HR: 97 BPM
STRESS TARGET HR: 135 BPM
WBC NRBC COR # BLD: 5.2 10*3/MM3 (ref 3.4–10.8)
WBC NRBC COR # BLD: 5.5 10*3/MM3 (ref 3.4–10.8)

## 2020-06-11 PROCEDURE — 0 TECHNETIUM SESTAMIBI: Performed by: FAMILY MEDICINE

## 2020-06-11 PROCEDURE — 83735 ASSAY OF MAGNESIUM: CPT | Performed by: FAMILY MEDICINE

## 2020-06-11 PROCEDURE — 99222 1ST HOSP IP/OBS MODERATE 55: CPT | Performed by: INTERNAL MEDICINE

## 2020-06-11 PROCEDURE — 78452 HT MUSCLE IMAGE SPECT MULT: CPT

## 2020-06-11 PROCEDURE — A9500 TC99M SESTAMIBI: HCPCS | Performed by: FAMILY MEDICINE

## 2020-06-11 PROCEDURE — 99225 PR SBSQ OBSERVATION CARE/DAY 25 MINUTES: CPT | Performed by: FAMILY MEDICINE

## 2020-06-11 PROCEDURE — 94799 UNLISTED PULMONARY SVC/PX: CPT

## 2020-06-11 PROCEDURE — 80048 BASIC METABOLIC PNL TOTAL CA: CPT | Performed by: FAMILY MEDICINE

## 2020-06-11 PROCEDURE — 87635 SARS-COV-2 COVID-19 AMP PRB: CPT | Performed by: FAMILY MEDICINE

## 2020-06-11 PROCEDURE — 25010000002 REGADENOSON 0.4 MG/5ML SOLUTION: Performed by: FAMILY MEDICINE

## 2020-06-11 PROCEDURE — 78452 HT MUSCLE IMAGE SPECT MULT: CPT | Performed by: INTERNAL MEDICINE

## 2020-06-11 PROCEDURE — 93018 CV STRESS TEST I&R ONLY: CPT | Performed by: NURSE PRACTITIONER

## 2020-06-11 PROCEDURE — 80048 BASIC METABOLIC PNL TOTAL CA: CPT | Performed by: INTERNAL MEDICINE

## 2020-06-11 PROCEDURE — 93017 CV STRESS TEST TRACING ONLY: CPT

## 2020-06-11 PROCEDURE — 85025 COMPLETE CBC W/AUTO DIFF WBC: CPT | Performed by: INTERNAL MEDICINE

## 2020-06-11 PROCEDURE — 63710000001 PREDNISONE PER 1 MG: Performed by: FAMILY MEDICINE

## 2020-06-11 PROCEDURE — 85025 COMPLETE CBC W/AUTO DIFF WBC: CPT | Performed by: FAMILY MEDICINE

## 2020-06-11 RX ORDER — ASPIRIN 81 MG/1
81 TABLET ORAL DAILY
Status: DISCONTINUED | OUTPATIENT
Start: 2020-06-11 | End: 2020-06-13 | Stop reason: HOSPADM

## 2020-06-11 RX ORDER — SODIUM CHLORIDE 9 MG/ML
1-3 INJECTION, SOLUTION INTRAVENOUS CONTINUOUS
Status: CANCELLED | OUTPATIENT
Start: 2020-06-11

## 2020-06-11 RX ORDER — ACETYLCYSTEINE 200 MG/ML
3 SOLUTION ORAL; RESPIRATORY (INHALATION) ONCE
Status: COMPLETED | OUTPATIENT
Start: 2020-06-11 | End: 2020-06-11

## 2020-06-11 RX ORDER — MIDAZOLAM HYDROCHLORIDE 1 MG/ML
1 INJECTION INTRAMUSCULAR; INTRAVENOUS ONCE
Status: CANCELLED | OUTPATIENT
Start: 2020-06-11 | End: 2020-06-11

## 2020-06-11 RX ADMIN — IPRATROPIUM BROMIDE AND ALBUTEROL SULFATE 3 ML: .5; 3 SOLUTION RESPIRATORY (INHALATION) at 22:58

## 2020-06-11 RX ADMIN — IPRATROPIUM BROMIDE AND ALBUTEROL SULFATE 3 ML: .5; 3 SOLUTION RESPIRATORY (INHALATION) at 07:26

## 2020-06-11 RX ADMIN — Medication 10 ML: at 21:57

## 2020-06-11 RX ADMIN — PREDNISONE 40 MG: 20 TABLET ORAL at 09:29

## 2020-06-11 RX ADMIN — PAROXETINE HYDROCHLORIDE 20 MG: 20 TABLET, FILM COATED ORAL at 06:04

## 2020-06-11 RX ADMIN — TECHNETIUM TC 99M SESTAMIBI 1 DOSE: 1 INJECTION INTRAVENOUS at 09:15

## 2020-06-11 RX ADMIN — IPRATROPIUM BROMIDE AND ALBUTEROL SULFATE 3 ML: .5; 3 SOLUTION RESPIRATORY (INHALATION) at 15:18

## 2020-06-11 RX ADMIN — HYDROXYZINE HYDROCHLORIDE 50 MG: 25 TABLET, FILM COATED ORAL at 21:57

## 2020-06-11 RX ADMIN — GUAIFENESIN 1200 MG: 600 TABLET, EXTENDED RELEASE ORAL at 23:51

## 2020-06-11 RX ADMIN — TECHNETIUM TC 99M SESTAMIBI 1 DOSE: 1 INJECTION INTRAVENOUS at 07:00

## 2020-06-11 RX ADMIN — ASPIRIN 81 MG: 81 TABLET, COATED ORAL at 17:16

## 2020-06-11 RX ADMIN — GUAIFENESIN 1200 MG: 600 TABLET, EXTENDED RELEASE ORAL at 11:02

## 2020-06-11 RX ADMIN — DICYCLOMINE HYDROCHLORIDE 10 MG: 10 CAPSULE ORAL at 11:02

## 2020-06-11 RX ADMIN — IPRATROPIUM BROMIDE AND ALBUTEROL SULFATE 3 ML: .5; 3 SOLUTION RESPIRATORY (INHALATION) at 02:58

## 2020-06-11 RX ADMIN — BUDESONIDE 0.5 MG: 0.5 INHALANT RESPIRATORY (INHALATION) at 07:26

## 2020-06-11 RX ADMIN — IPRATROPIUM BROMIDE AND ALBUTEROL SULFATE 3 ML: .5; 3 SOLUTION RESPIRATORY (INHALATION) at 18:26

## 2020-06-11 RX ADMIN — REGADENOSON 0.4 MG: 0.08 INJECTION, SOLUTION INTRAVENOUS at 08:15

## 2020-06-11 RX ADMIN — DICYCLOMINE HYDROCHLORIDE 10 MG: 10 CAPSULE ORAL at 21:57

## 2020-06-11 RX ADMIN — ACETYLCYSTEINE 3 ML: 200 SOLUTION ORAL; RESPIRATORY (INHALATION) at 18:27

## 2020-06-11 RX ADMIN — METOPROLOL TARTRATE 25 MG: 25 TABLET, FILM COATED ORAL at 09:29

## 2020-06-11 RX ADMIN — DICYCLOMINE HYDROCHLORIDE 10 MG: 10 CAPSULE ORAL at 17:16

## 2020-06-11 RX ADMIN — BUDESONIDE 0.5 MG: 0.5 INHALANT RESPIRATORY (INHALATION) at 18:26

## 2020-06-11 RX ADMIN — IPRATROPIUM BROMIDE AND ALBUTEROL SULFATE 3 ML: .5; 3 SOLUTION RESPIRATORY (INHALATION) at 11:33

## 2020-06-11 RX ADMIN — MIRTAZAPINE 30 MG: 15 TABLET, FILM COATED ORAL at 21:57

## 2020-06-11 RX ADMIN — ATORVASTATIN CALCIUM 20 MG: 20 TABLET, FILM COATED ORAL at 09:29

## 2020-06-11 RX ADMIN — DICYCLOMINE HYDROCHLORIDE 10 MG: 10 CAPSULE ORAL at 09:29

## 2020-06-11 NOTE — PLAN OF CARE
Problem: Patient Care Overview  Goal: Plan of Care Review  Outcome: Ongoing (interventions implemented as appropriate)  Flowsheets (Taken 6/10/2020 1901)  Plan of Care Reviewed With: patient  Note:   Patient rested comfortably in bed throughout the night without complaints of pain.  Patient complained of anxiety, treated per MAR.  No new concerns at this time, will continue to monitor.

## 2020-06-11 NOTE — PLAN OF CARE
Problem: Patient Care Overview  Goal: Plan of Care Review  Outcome: Ongoing (interventions implemented as appropriate)   Pt had stress today. Heart cath planned for 6/12. No complaints. Will continue to monitor.

## 2020-06-11 NOTE — PROGRESS NOTES
"      [facility] Medicine Services Daily Progress Note      Hospitalist Team  LOS 1 days      Patient Care Team:  Stanley French MD as PCP - General (Family Medicine)    Patient Location: 366[address]      Subjective   Subjective     Chief Complaint / Subjective  Chief Complaint   Patient presents with   • Shortness of Breath     RA sat of 74% on EMS arrival, 94% on duoneb currnetly upon arrival      Patient reports feeling the same as yesterday.  At rest no shortness of breath but continues to desat on room air.  Patient requiring 4 L of oxygen today, denies any further chest pain.  Awaiting results of stress test.  Trial of NAC    Brief Synopsis of Hospital Course/HPI  60 year old female with PMH COPD and continued tobacco use , CAD s/p PCI presents with complaints of increased shortness of air past few days. She denies nausea, chest pain, dizziness, fever , cough or known Covid-19 exposure. Her oxygen sats were in 70-80s in ED which improved with IV steroids , duo neb treatments and oxygen via nasal canula, CXR per radiology:     \"IMPRESSION:  Stable appearance of the chest. No active cardiopulmonary disease.  Chronic pleural thickening versus small chronic or recurrent pleural  effusions are noted.'     She is afebrile , Hgb 17.5, wbc 5.3, Na 127. EKG and troponin have been subsequently ordered and pending . She will be admitted for COPD exacerbation. PMH includes colon cancer, hyperlipidemia, depression, chronic hypertension, IBS. She denies known Covid-19 exposure .    6/10/2020: Patient reports feeling much better since initiation of nebulizers and steroids.  Breathing more comfortably and minimal coughing.  Patient reports that she has had some intermittent chest pain as well in the last week along with exertional dyspnea.  Patient has cardiologist but has not seen in multiple years.  When patient on room air after walking to bathroom she desatted into the 70s.    Date::          Review " "of Systems   Constitution: Positive for malaise/fatigue. Negative for chills and fever.   HENT: Negative for hoarse voice and stridor.    Eyes: Negative for double vision and photophobia.   Cardiovascular: Negative for chest pain, irregular heartbeat and syncope.   Respiratory: Positive for shortness of breath and wheezing. Negative for cough and sputum production.    Musculoskeletal: Negative for falls and muscle weakness.   Gastrointestinal: Negative for nausea and vomiting.   Genitourinary: Negative for dysuria and flank pain.   Neurological: Negative for headaches and loss of balance.   Psychiatric/Behavioral: Negative for altered mental status. The patient is not nervous/anxious.          Objective   Objective      Vital Signs  Temp:  [97.7 °F (36.5 °C)-98.2 °F (36.8 °C)] 98.2 °F (36.8 °C)  Heart Rate:  [65-81] 73  Resp:  [14-21] 16  BP: (103-131)/(64-78) 103/64  Oxygen Therapy  SpO2: 97 %  Pulse Oximetry Type: Intermittent  Device (Oxygen Therapy): nasal cannula  Device (Oxygen Therapy): nasal cannula  Flow (L/min): 4  Oxygen Concentration (%): 28  Flowsheet Rows      First Filed Value   Admission Height  165.1 cm (65\") Documented at 06/09/2020 2011   Admission Weight  52.2 kg (115 lb) Documented at 06/09/2020 2011        Intake & Output (last 3 days)       06/08 0701 - 06/09 0700 06/09 0701 - 06/10 0700 06/10 0701 - 06/11 0700 06/11 0701 - 06/12 0700    P.O.   880 120    Total Intake(mL/kg)   880 (16.9) 120 (2.3)    Net   +880 +120                Lines, Drains & Airways    Active LDAs     Name:   Placement date:   Placement time:   Site:   Days:    Peripheral IV 06/09/20 2026 Left Antecubital   06/09/20 2026    Antecubital   less than 1                  Physical Exam:    Physical Exam    General: Slender elderly female lying in bed breathing comfortably on 4 L via nasal cannula no acute distress  HEENT: NC/AT, EOMI, mucosa moist  Heart: Noticed, rate controlled  Chest: Normal work of breathing, poor air " movement throughout, expiratory wheezing noted bilaterally no chest wall tenderness  Abdominal: Soft. NT/ND.   Musculoskeletal: Normal ROM.  No edema. No calf tenderness.  Neurological: AAOx3, no focal deficits  Skin: Skin is warm and dry. No rash  Psychiatric: Normal mood and affect.        Procedures:              Results Review:     I reviewed the patient's new clinical results.      Lab Results (last 24 hours)     Procedure Component Value Units Date/Time    BUN [[id number]]  (Normal) Collected:  06/11/20 0212    Specimen:  Blood Updated:  06/11/20 0740     BUN 14 mg/dL     Basic Metabolic Panel [[id number]]  (Abnormal) Collected:  06/11/20 0212    Specimen:  Blood Updated:  06/11/20 0430     Glucose 104 mg/dL      BUN --     Comment: Testing performed by alternate method        Creatinine 0.55 mg/dL      Sodium 136 mmol/L      Potassium 4.5 mmol/L      Chloride 91 mmol/L      CO2 40.0 mmol/L      Calcium 9.6 mg/dL      eGFR Non African Amer 112 mL/min/1.73      BUN/Creatinine Ratio --     Comment: Testing not performed.        Anion Gap 5.0 mmol/L     Narrative:       GFR Normal >60  Chronic Kidney Disease <60  Kidney Failure <15      Magnesium [[id number]]  (Normal) Collected:  06/11/20 0212    Specimen:  Blood Updated:  06/11/20 0430     Magnesium 1.6 mg/dL     CBC & Differential [[id number]] Collected:  06/11/20 0212    Specimen:  Blood Updated:  06/11/20 0408    Narrative:       The following orders were created for panel order CBC & Differential.  Procedure                               Abnormality         Status                     ---------                               -----------         ------                     CBC Auto Differential[[id number]]        Abnormal            Final result                 Please view results for these tests on the individual orders.    CBC Auto Differential [[id number]]  (Abnormal) Collected:  06/11/20 0212    Specimen:  Blood Updated:  06/11/20 0408     WBC 5.20 10*3/mm3       RBC 5.46 10*6/mm3      Hemoglobin 16.5 g/dL      Hematocrit 50.2 %      MCV 92.0 fL      MCH 30.2 pg      MCHC 32.9 g/dL      RDW 14.8 %      RDW-SD 47.7 fl      MPV 6.9 fL      Platelets 243 10*3/mm3      Neutrophil % 67.2 %      Lymphocyte % 17.5 %      Monocyte % 14.4 %      Eosinophil % 0.0 %      Basophil % 0.9 %      Neutrophils, Absolute 3.50 10*3/mm3      Lymphocytes, Absolute 0.90 10*3/mm3      Monocytes, Absolute 0.80 10*3/mm3      Eosinophils, Absolute 0.00 10*3/mm3      Basophils, Absolute 0.00 10*3/mm3      nRBC 0.1 /100 WBC     SCANNED - LABS [[id number]] Resulted:  06/09/20      Updated:  06/10/20 1848    Troponin [[id number]]  (Normal) Collected:  06/10/20 1704    Specimen:  Blood Updated:  06/10/20 1806     Troponin T <0.010 ng/mL     Narrative:       Troponin T Reference Range:  <= 0.03 ng/mL-   Negative for AMI  >0.03 ng/mL-     Abnormal for myocardial necrosis.  Clinicians would have to utilize clinical acumen, EKG, Troponin and serial changes to determine if it is an Acute Myocardial Infarction or myocardial injury due to an underlying chronic condition.       Results may be falsely decreased if patient taking Biotin.      Blood Gas, Arterial [[id number]]  (Abnormal) Collected:  06/09/20 2108    Specimen:  Arterial Blood Updated:  06/10/20 1609     Site Right Radial     Melvin's Test Positive     pH, Arterial 7.424 pH units      pCO2, Arterial 55.0 mm Hg      pO2, Arterial 60.5 mm Hg      HCO3, Arterial 36.0 mmol/L      Base Excess, Arterial 8.7 mmol/L      Comment: Serial Number: [id number]:  [id number]        O2 Saturation, Arterial 90.6 %      CO2 Content 37.7 mmol/L      Barometric Pressure for Blood Gas --     Comment: N/A        Modality Cannula     FIO2 28 %      Hemodilution No    BUN [[id number]]  (Normal) Collected:  06/10/20 1153    Specimen:  Blood Updated:  06/10/20 1428     BUN 11 mg/dL     Basic Metabolic Panel [[id number]]  (Abnormal) Collected:  06/10/20 1153    Specimen:  Blood  Updated:  06/10/20 1411     Glucose 244 mg/dL      BUN --     Comment: Testing performed by alternate method        Creatinine 0.57 mg/dL      Sodium 133 mmol/L      Potassium 3.9 mmol/L      Chloride 84 mmol/L      CO2 36.0 mmol/L      Calcium 9.1 mg/dL      eGFR Non African Amer 108 mL/min/1.73      BUN/Creatinine Ratio --     Comment: Testing not performed.        Anion Gap 13.0 mmol/L     Narrative:       GFR Normal >60  Chronic Kidney Disease <60  Kidney Failure <15          No results found for: HGBA1C        Results from last 7 days   Lab Units 06/09/20  2108   PH, ARTERIAL pH units 7.424   PO2 ART mm Hg 60.5*   PCO2, ARTERIAL mm Hg 55.0*   HCO3 ART mmol/L 36.0*     No results found for: LIPASE  No results found for: CHOL, CHLPL, TRIG, HDL, LDL, LDLDIRECT    No results found for: INTRAOP, PREDX, FINALDX, COMDX    Microbiology Results (last 10 days)     ** No results found for the last 240 hours. **          ECG/EMG Results (most recent)     Procedure Component Value Units Date/Time    ECG 12 Lead [[id number]] Collected:  06/09/20 2024     Updated:  06/09/20 2027    Narrative:       HEART RATE= 95  bpm  RR Interval= 632  ms  DC Interval= 122  ms  P Horizontal Axis= -9  deg  P Front Axis= 82  deg  QRSD Interval= 78  ms  QT Interval= 358  ms  QRS Axis= -9  deg  T Wave Axis= 15  deg  - BORDERLINE ECG -  Sinus rhythm  Biatrial enlargement  When compared with ECG of 20-Dec-2018 10:53:52,  Significant change in rhythm  Significant axis, voltage or hypertrophy change  Electronically Signed By:   Date and Time of Study: 2020-06-09 20:24:23                    Xr Chest 1 View    Result Date: 6/9/2020  Stable appearance of the chest. No active cardiopulmonary disease. Chronic pleural thickening versus small chronic or recurrent pleural effusions are noted.   Electronically Signed By-Kyree Black DO. On:6/9/2020 10:29 PM This report was finalized on [id number] by  Kyree Black DO..    Ct Chest Pulmonary  Embolism    Result Date: 6/10/2020   1. No pulmonary embolus. 2. Mild emphysema, unchanged. There is also mild bronchial wall thickening diffusely compatible with chronic or acute bronchitis. It is a change since 2016. Stable postsurgical changes from partial right lower lobectomy.  Electronically Signed By-Kyree Black DO. On:6/10/2020 4:45 PM This report was finalized on [id number] by  Kyree Black DO..          Xrays, labs reviewed personally by physician.    Medication Review:   I have reviewed the patient's current medication list      Scheduled Meds    acetylcysteine 3 mL Nebulization Once   atorvastatin 20 mg Oral Daily   budesonide 0.5 mg Nebulization BID - RT   dicyclomine 10 mg Oral 4x Daily   guaiFENesin 1,200 mg Oral Q12H   ipratropium-albuterol 3 mL Nebulization Q4H - RT   metoprolol tartrate 25 mg Oral Daily   mirtazapine 30 mg Oral Nightly   PARoxetine 20 mg Oral QAM   predniSONE 40 mg Oral Daily With Breakfast       Meds Infusions       Meds PRN  albuterol  •  guaiFENesin-dextromethorphan  •  hydrOXYzine  •  ipratropium-albuterol  •  ipratropium-albuterol  •  [COMPLETED] Insert peripheral IV **AND** sodium chloride        Assessment/Plan   Assessment/Plan     Active Hospital Problems    Diagnosis  POA   • Dyspnea [R06.00]  Yes   • Acute on chronic respiratory failure with hypoxia (CMS/HCC) [J96.21]  Yes      Resolved Hospital Problems   No resolved problems to display.       MEDICAL DECISION MAKING COMPLEXITY BY PROBLEM:     Shortness of breath -may be multifactorial.  Patient's history of tobacco use and wheezing consistent with COPD exacerbation.  However patient also has cardiac history and noted chest pain with exertional dyspnea  -Check troponins periodically  -Mucolytic, addition of NAC  -Nebulizers  -Steroids  -No sputum present  -Stress test results pending  -Inpatient versus outpatient cardiology follow-up  -CT PE negative    Hyponatremia -possible ADH mediated stress response,  improved from admission of 127, now normalized  -Monitor daily  -Patient appears euvolemic    Polycythemia -secondary most likely due to smoking history  -Monitor daily    Coronary artery disease -with history of MI has not seen cardiology in multiple years  -Check troponins  -Telemetry  -Follow-up stress test results.    Hypertension/depression/hormone replacement/IBS -chronic in nature  -Hold hormones  -Resume other medications clinically appropriate     VTE Prophylaxis -   Mechanical Order History:      Ordered        06/09/20 2233  Place Sequential Compression Device  Once         06/09/20 2233  Maintain Sequential Compression Device  Continuous                 Pharmalogical Order History:     None            Code Status -   Code Status and Medical Interventions:   Ordered at: 06/09/20 2154     Code Status:    CPR     Medical Interventions (Level of Support Prior to Arrest):    Full           Discharge Planning          Destination      Coordination has not been started for this encounter.      Durable Medical Equipment      Coordination has not been started for this encounter.      Dialysis/Infusion      Coordination has not been started for this encounter.      Home Medical Care      Coordination has not been started for this encounter.      Therapy      Coordination has not been started for this encounter.      Community Resources      Coordination has not been started for this encounter.            Electronically signed by Peter Eduardo MD, 06/11/20, 13:49.  [facility]ist Team

## 2020-06-11 NOTE — CONSULTS
Referring Provider: Hospitalist  Reason for Consultation: Angina and abnormal Myoview    Patient Care Team:  Stanley French MD as PCP - General (Family Medicine)    Chief complaint angina    Subjective .     History of present illness:  Jocelin Wills is a 60 y.o. female with history of coronary status post MI and stent placement to the LAD and circumflex artery in the past history of COPD hypertension hyperlipidemia and other medical problems presented to the hospital complains of chest pain or shortness of breath for the last several days of increasing severity.  Patient described chest pain as a substernal discomfort without any radiation but also with shortness of breath.  No complains of any PND orthopnea.  No palpitation dizziness syncope or swelling of the feet.  She has been taking her medicines regularly.  She was admitted and ruled out for MI by EKG enzymes.  She underwent a stress Myoview which was abnormal and hence a cardiology consult was called.  Review of Systems   Constitution: Negative for fever and malaise/fatigue.   HENT: Negative for ear pain and nosebleeds.    Eyes: Negative for blurred vision and double vision.   Cardiovascular: Positive for chest pain. Negative for dyspnea on exertion and palpitations.   Respiratory: Positive for shortness of breath. Negative for cough.    Skin: Negative for rash.   Musculoskeletal: Negative for joint pain.   Gastrointestinal: Negative for abdominal pain, nausea and vomiting.   Neurological: Negative for focal weakness and headaches.   Psychiatric/Behavioral: Negative for depression. The patient is not nervous/anxious.    All other systems reviewed and are negative.      History  Past Medical History:   Diagnosis Date   • CAD (coronary artery disease)    • Colon cancer (CMS/HCC) 04/13/2016    Colon Cancer Tumors we found on 04/13/2016   • COPD (chronic obstructive pulmonary disease) (CMS/HCC)    • Hyperlipidemia    • Hypertension    • Myocardial  infarction (CMS/HCC)        Past Surgical History:   Procedure Laterality Date   • BACK SURGERY     • CARDIAC CATHETERIZATION     • COLON SURGERY     • COLONOSCOPY     • CORONARY ANGIOPLASTY      Stent Placement   • ENDOSCOPY     • HYSTERECTOMY     • LUNG SURGERY     • NECK SURGERY     • OTHER SURGICAL HISTORY  2018    Rm 18 post op right L2-L3 microdiscectomy 2018       Family History   Problem Relation Age of Onset   • Lung disease Mother         Lung/Respiratory Disease   • Diabetes Father    • Heart disease Father    • Heart disease Sister    • Diabetes Sister        Social History     Tobacco Use   • Smoking status: Former Smoker     Packs/day: 1.50     Types: Cigarettes     Last attempt to quit: 2020     Years since quittin.0   • Smokeless tobacco: Never Used   Substance Use Topics   • Alcohol use: Never     Frequency: Never   • Drug use: Never        Medications Prior to Admission   Medication Sig Dispense Refill Last Dose   • albuterol sulfate  (90 Base) MCG/ACT inhaler Inhale 2 puffs Every 4 (Four) Hours As Needed for Wheezing.      • atorvastatin (LIPITOR) 20 MG tablet Take 20 mg by mouth Daily.      • dicyclomine (BENTYL) 20 MG tablet Take 20 mg by mouth Every 6 (Six) Hours.      • estradiol (ESTRACE) 1 MG tablet Take 1 mg by mouth Daily.      • ipratropium-albuterol (DUO-NEB) 0.5-2.5 mg/3 ml nebulizer Take 3 mL by nebulization Every 4 (Four) Hours As Needed for Wheezing.      • meloxicam (MOBIC) 15 MG tablet Take 15 mg by mouth Daily.      • metoprolol tartrate (LOPRESSOR) 25 MG tablet Take 25 mg by mouth Daily.      • mirtazapine (REMERON) 30 MG tablet Take 30 mg by mouth Every Night.      • PARoxetine (PAXIL) 20 MG tablet Take 20 mg by mouth Every Morning.            Latex    Scheduled Meds:    acetylcysteine 3 mL Nebulization Once   aspirin 81 mg Oral Daily   atorvastatin 20 mg Oral Daily   budesonide 0.5 mg Nebulization BID - RT   dicyclomine 10 mg Oral 4x Daily    "  guaiFENesin 1,200 mg Oral Q12H   ipratropium-albuterol 3 mL Nebulization Q4H - RT   metoprolol tartrate 25 mg Oral Daily   mirtazapine 30 mg Oral Nightly   PARoxetine 20 mg Oral QAM   predniSONE 40 mg Oral Daily With Breakfast     Continuous Infusions:   PRN Meds:.albuterol  •  guaiFENesin-dextromethorphan  •  hydrOXYzine  •  ipratropium-albuterol  •  ipratropium-albuterol  •  [COMPLETED] Insert peripheral IV **AND** sodium chloride    Objective     VITAL SIGNS  Vitals:    06/11/20 1135 06/11/20 1200 06/11/20 1518 06/11/20 1520   BP:  103/64     BP Location:  Right arm     Patient Position:  Lying     Pulse: 77 73 71 70   Resp: 16 16 16 16   Temp:  98.2 °F (36.8 °C)     TempSrc:  Oral     SpO2:  97% 96%    Weight:       Height:           Flowsheet Rows      First Filed Value   Admission Height  165.1 cm (65\") Documented at 06/09/2020 2011   Admission Weight  52.2 kg (115 lb) Documented at 06/09/2020 2011           TELEMETRY: Sinus rhythm with nonspecific ST segment abnormalities    Physical Exam:  Physical Exam   Constitutional: She appears well-developed and well-nourished.   HENT:   Head: Normocephalic and atraumatic.   Eyes: Pupils are equal, round, and reactive to light. Conjunctivae and EOM are normal. No scleral icterus.   Neck: Normal range of motion. Neck supple. No JVD present. Carotid bruit is not present.   Cardiovascular: Normal rate, regular rhythm, S1 normal, S2 normal, normal heart sounds and intact distal pulses. PMI is not displaced.   Pulmonary/Chest: Effort normal. She has wheezes. She has no rales.   Abdominal: Soft. Bowel sounds are normal.   Musculoskeletal: Normal range of motion.   Neurological: She is alert. She has normal strength.   No focal deficits   Skin: Skin is warm and dry. No rash noted.   Psychiatric: She has a normal mood and affect.        Results Review:   I reviewed the patient's new clinical results.  Lab Results (last 24 hours)     Procedure Component Value Units Date/Time "    BUN [[id number]]  (Normal) Collected:  06/11/20 0212    Specimen:  Blood Updated:  06/11/20 0740     BUN 14 mg/dL     Basic Metabolic Panel [[id number]]  (Abnormal) Collected:  06/11/20 0212    Specimen:  Blood Updated:  06/11/20 0430     Glucose 104 mg/dL      BUN --     Comment: Testing performed by alternate method        Creatinine 0.55 mg/dL      Sodium 136 mmol/L      Potassium 4.5 mmol/L      Chloride 91 mmol/L      CO2 40.0 mmol/L      Calcium 9.6 mg/dL      eGFR Non African Amer 112 mL/min/1.73      BUN/Creatinine Ratio --     Comment: Testing not performed.        Anion Gap 5.0 mmol/L     Narrative:       GFR Normal >60  Chronic Kidney Disease <60  Kidney Failure <15      Magnesium [[id number]]  (Normal) Collected:  06/11/20 0212    Specimen:  Blood Updated:  06/11/20 0430     Magnesium 1.6 mg/dL     CBC & Differential [[id number]] Collected:  06/11/20 0212    Specimen:  Blood Updated:  06/11/20 0408    Narrative:       The following orders were created for panel order CBC & Differential.  Procedure                               Abnormality         Status                     ---------                               -----------         ------                     CBC Auto Differential[[id number]]        Abnormal            Final result                 Please view results for these tests on the individual orders.    CBC Auto Differential [[id number]]  (Abnormal) Collected:  06/11/20 0212    Specimen:  Blood Updated:  06/11/20 0408     WBC 5.20 10*3/mm3      RBC 5.46 10*6/mm3      Hemoglobin 16.5 g/dL      Hematocrit 50.2 %      MCV 92.0 fL      MCH 30.2 pg      MCHC 32.9 g/dL      RDW 14.8 %      RDW-SD 47.7 fl      MPV 6.9 fL      Platelets 243 10*3/mm3      Neutrophil % 67.2 %      Lymphocyte % 17.5 %      Monocyte % 14.4 %      Eosinophil % 0.0 %      Basophil % 0.9 %      Neutrophils, Absolute 3.50 10*3/mm3      Lymphocytes, Absolute 0.90 10*3/mm3      Monocytes, Absolute 0.80 10*3/mm3      Eosinophils,  Absolute 0.00 10*3/mm3      Basophils, Absolute 0.00 10*3/mm3      nRBC 0.1 /100 WBC     SCANNED - LABS [[id number]] Resulted:  06/09/20      Updated:  06/10/20 1848    Troponin [[id number]]  (Normal) Collected:  06/10/20 1704    Specimen:  Blood Updated:  06/10/20 1806     Troponin T <0.010 ng/mL     Narrative:       Troponin T Reference Range:  <= 0.03 ng/mL-   Negative for AMI  >0.03 ng/mL-     Abnormal for myocardial necrosis.  Clinicians would have to utilize clinical acumen, EKG, Troponin and serial changes to determine if it is an Acute Myocardial Infarction or myocardial injury due to an underlying chronic condition.       Results may be falsely decreased if patient taking Biotin.            Imaging Results (Last 24 Hours)     ** No results found for the last 24 hours. **          EKG      I personally viewed and interpreted the patient's EKG/Telemetry data:    ECHOCARDIOGRAM:      STRESS MYOVIEW:    CARDIAC CATHETERIZATION:    OTHER:         Assessment/Plan     Active Problems:    Dyspnea    Acute on chronic respiratory failure with hypoxia (CMS/HCC)  Chest pain  History of coronary disease  Hypertension  Hyperlipidemia  COPD    Patient presents with chest pain or shortness of breath and is ruled out for MI by EKG and enzymes  Patient also had a CT scan and there was no pulmonary embolism him  Patient had a stress partially which is abnormal  Patient has history of coronary disease status post MI and stent placement to the LAD and circumflex artery in the past  Patient will have a cardiac catheterization performed to rule out significant coronary disease  Discussed with patient about procedure risks and benefits  Blood pressure and heart rate stable  Patient's lipid levels are followed by the primary care doctor  Patient continues to smoke and is advised to stop smoking and has secondary effects with polycythemia also.    I discussed the patients findings and my recommendations with patient and nurse    Jose  MD Lena  06/11/20  17:47

## 2020-06-11 NOTE — PROGRESS NOTES
Discharge Planning Assessment   [facility]     Patient Name: Jocelin Wills  MRN: [id number]  Today's Date: 6/11/2020    Admit Date: 6/9/2020              Plan    Plan Comments  barriers to discharge is patient continues with oxygen 4 l min per nc;       Carol naegele rn  Case management  Office number [phone]  Cell phone [phone]

## 2020-06-11 NOTE — CONSULTS
"Nutrition Services    Patient Name:  Jocelin Wills  YOB: 1960  MRN: [id number]  Admit Date:  6/9/2020    Comments: Declined need for supplement     Reason for Assessment                Reason for Assessment    Reason For Assessment Date: 6/11/20  AMELIA/MST2     Diagnosis H&P: CAD, Colon Cancer, Hyperlipidemia, HTN, Myocardial infarction    Current Problems:   Short of Breath  -tobacco use and wheezing consistent with COPD exacerbation    Hyponatremia  -resolved    Polycythemia  -2* most likely due to smoking hx    CAD         Nutrition/Diet History                Nutrition/Diet History    Narrative     6/11: D/W appetite when at home and she stated she was only able to eat 1 meal per day. It consisted of a fast food meal hamberger or chicken nuggets with fries and a diet drink. Eating well since at hospital. Declined need for supplement.      Functional Status Able to feed self      Food Allergies NKFA     Factors Affecting Nutritional Intake SOA          Anthropometrics             Anthropometrics    Height 65\"     Weight 115 lb (6/9/20)       Admit Weight    Admit Weight 115 lb (6/9/20)       Ideal Body Weight (IBW)    Ideal Body Weight (IBW) 125 lb     % Ideal Body Weight 92%       Usual Body Weight (UBW)    Usual Body Weight 116 lb     % Usual Body Weight 99%    Weight Hx  112 lb (1/14/19)  109 lb (12/17/18)  108 lb (11/3/16)  110 lb (4/21/16)        Body Mass Index (BMI)    BMI (kg/m2) 19.1           Labs/Medications                Labs    Pertinent Lab Results Comments Gluc 104 (H), Crt. .55 (L), H/H 16.5/50.2 (H)         Medications    Pertinent Medications Comments Lipitor, Remeron, Prednisone         Physical Findings                Physical Findings    Overall Physical Appearance Unable to observe r/t limiting face-to-face contact in context of the COVID19 pandemic      Edema  No edema charted      Gastrointestinal +BM 6/9     Tubes      Oral/Mouth Cavity No swallowing or chewing problems " per pt      Skin Skin intact         Estimated/Assessed Needs              Calorie Requirements     Height Used for Calorie Calculations       Weight Used for Calorie Calculations      Formula chosen for Calorie Calculations       Estimated Calorie Requirements (kcals/day)              Protein Requirements    Weight Used For Protein Calculations       Est Protein Requirement Amount (gms/kg)       Estimated Protein Requirements (gms/day)          Fluid Requirements     Estimated Fluid Requirements (mL/day)            Fluid Deficit       Current Sodium Level (mEq/L)      Desired Sodium Level (mEq/L)      Estimated Fluid Deficit Needs (L)           Nutrition Prescription Ordered                Nutrition Prescription PO    Current PO Diet  Regular      Supplement         Nutrition Prescription EN    Enteral Route -     TF modular -     TF Delivery Method -     Current Ordered TF  -     Current Ordered Water flush  -     TF Observation  -        Nutrition Prescription TPN    TPN Route -     Current Ordered TPN Volume  -     Dextrose (gm/kcals)  -     Amino Acid (gm/kcals) -     Lipids (ML/Concentration/Frequency)  -     TPN Observation  -          Evaluation of Received Nutrient/Fluid Intake                PO Evaluation    % PO Intake 100% x 2 meals doc         EN Evaluation    TF Changes -     TF Residual -     TF Tolerance      Average EN Delivered  -        TPN Evaluation    Total Number of Days on TPN  -           Clinical Course      Clinical Nutrition Course Details           Problem/Interventions:                     Nutrition Diagnoses Problem 1      Problem 1   None at this time     Nutrition Diagnoses Problem 2      Problem 2            Intervention Goal                Intervention Goal    General Eats > 75% of meals          Nutrition Intervention                Nutrition Intervention    RD/Tech Action Declined supp         Nutrition Prescription                Nutrition Prescription PO      Diet  Regular       Supplement         Nutrition Prescription EN    Enteral Prescription -        Nutrition Prescription TPN    TPN Prescription -         Monitor/Evaluation                Monitor/Evaluation    Monitor M/E wt, intake, labs, meds, skin, BM            Electronically signed by:  Shannan Gresham RD  06/11/20 09:50

## 2020-06-12 LAB
ANION GAP SERPL CALCULATED.3IONS-SCNC: 6 MMOL/L (ref 5–15)
BASOPHILS # BLD AUTO: 0 10*3/MM3 (ref 0–0.2)
BASOPHILS NFR BLD AUTO: 0.4 % (ref 0–1.5)
BUN BLD-MCNC: 20 MG/DL (ref 8–23)
BUN BLD-MCNC: 20 MG/DL (ref 8–23)
BUN BLD-MCNC: ABNORMAL MG/DL
BUN/CREAT SERPL: ABNORMAL
CALCIUM SPEC-SCNC: 9 MG/DL (ref 8.6–10.5)
CHLORIDE SERPL-SCNC: 93 MMOL/L (ref 98–107)
CO2 SERPL-SCNC: 39 MMOL/L (ref 22–29)
CREAT BLD-MCNC: 0.52 MG/DL (ref 0.57–1)
DEPRECATED RDW RBC AUTO: 46.4 FL (ref 37–54)
EOSINOPHIL # BLD AUTO: 0 10*3/MM3 (ref 0–0.4)
EOSINOPHIL NFR BLD AUTO: 0 % (ref 0.3–6.2)
ERYTHROCYTE [DISTWIDTH] IN BLOOD BY AUTOMATED COUNT: 14.4 % (ref 12.3–15.4)
GFR SERPL CREATININE-BSD FRML MDRD: 120 ML/MIN/1.73
GLUCOSE BLD-MCNC: 86 MG/DL (ref 65–99)
HCT VFR BLD AUTO: 46 % (ref 34–46.6)
HGB BLD-MCNC: 15.1 G/DL (ref 12–15.9)
LYMPHOCYTES # BLD AUTO: 1.4 10*3/MM3 (ref 0.7–3.1)
LYMPHOCYTES NFR BLD AUTO: 23.9 % (ref 19.6–45.3)
MAGNESIUM SERPL-MCNC: 1.6 MG/DL (ref 1.6–2.4)
MCH RBC QN AUTO: 30 PG (ref 26.6–33)
MCHC RBC AUTO-ENTMCNC: 32.8 G/DL (ref 31.5–35.7)
MCV RBC AUTO: 91.6 FL (ref 79–97)
MONOCYTES # BLD AUTO: 0.8 10*3/MM3 (ref 0.1–0.9)
MONOCYTES NFR BLD AUTO: 13.1 % (ref 5–12)
NEUTROPHILS # BLD AUTO: 3.7 10*3/MM3 (ref 1.7–7)
NEUTROPHILS NFR BLD AUTO: 62.6 % (ref 42.7–76)
NRBC BLD AUTO-RTO: 0.3 /100 WBC (ref 0–0.2)
PLATELET # BLD AUTO: 240 10*3/MM3 (ref 140–450)
PMV BLD AUTO: 6.7 FL (ref 6–12)
POTASSIUM BLD-SCNC: 4.4 MMOL/L (ref 3.5–5.2)
RBC # BLD AUTO: 5.02 10*6/MM3 (ref 3.77–5.28)
SARS-COV-2 RNA PNL SPEC NAA+PROBE: NOT DETECTED
SODIUM BLD-SCNC: 138 MMOL/L (ref 136–145)
WBC NRBC COR # BLD: 5.9 10*3/MM3 (ref 3.4–10.8)

## 2020-06-12 PROCEDURE — 80048 BASIC METABOLIC PNL TOTAL CA: CPT | Performed by: FAMILY MEDICINE

## 2020-06-12 PROCEDURE — 93458 L HRT ARTERY/VENTRICLE ANGIO: CPT | Performed by: INTERNAL MEDICINE

## 2020-06-12 PROCEDURE — 94799 UNLISTED PULMONARY SVC/PX: CPT

## 2020-06-12 PROCEDURE — 0 IOPAMIDOL PER 1 ML: Performed by: INTERNAL MEDICINE

## 2020-06-12 PROCEDURE — C1769 GUIDE WIRE: HCPCS | Performed by: INTERNAL MEDICINE

## 2020-06-12 PROCEDURE — 99232 SBSQ HOSP IP/OBS MODERATE 35: CPT | Performed by: FAMILY MEDICINE

## 2020-06-12 PROCEDURE — C1894 INTRO/SHEATH, NON-LASER: HCPCS | Performed by: INTERNAL MEDICINE

## 2020-06-12 PROCEDURE — 85025 COMPLETE CBC W/AUTO DIFF WBC: CPT | Performed by: FAMILY MEDICINE

## 2020-06-12 PROCEDURE — 99152 MOD SED SAME PHYS/QHP 5/>YRS: CPT | Performed by: INTERNAL MEDICINE

## 2020-06-12 PROCEDURE — 25010000002 FENTANYL CITRATE (PF) 100 MCG/2ML SOLUTION: Performed by: INTERNAL MEDICINE

## 2020-06-12 PROCEDURE — B2111ZZ FLUOROSCOPY OF MULTIPLE CORONARY ARTERIES USING LOW OSMOLAR CONTRAST: ICD-10-PCS | Performed by: INTERNAL MEDICINE

## 2020-06-12 PROCEDURE — 63710000001 PREDNISONE PER 1 MG: Performed by: FAMILY MEDICINE

## 2020-06-12 PROCEDURE — 99232 SBSQ HOSP IP/OBS MODERATE 35: CPT | Performed by: INTERNAL MEDICINE

## 2020-06-12 PROCEDURE — 83735 ASSAY OF MAGNESIUM: CPT | Performed by: FAMILY MEDICINE

## 2020-06-12 PROCEDURE — 25010000002 MIDAZOLAM PER 1 MG: Performed by: INTERNAL MEDICINE

## 2020-06-12 PROCEDURE — 4A023N7 MEASUREMENT OF CARDIAC SAMPLING AND PRESSURE, LEFT HEART, PERCUTANEOUS APPROACH: ICD-10-PCS | Performed by: INTERNAL MEDICINE

## 2020-06-12 RX ORDER — NITROGLYCERIN 5 MG/ML
INJECTION, SOLUTION INTRAVENOUS AS NEEDED
Status: DISCONTINUED | OUTPATIENT
Start: 2020-06-12 | End: 2020-06-12 | Stop reason: HOSPADM

## 2020-06-12 RX ORDER — MIDAZOLAM HYDROCHLORIDE 1 MG/ML
INJECTION INTRAMUSCULAR; INTRAVENOUS AS NEEDED
Status: DISCONTINUED | OUTPATIENT
Start: 2020-06-12 | End: 2020-06-12 | Stop reason: HOSPADM

## 2020-06-12 RX ORDER — SODIUM CHLORIDE 9 MG/ML
250 INJECTION, SOLUTION INTRAVENOUS ONCE AS NEEDED
Status: DISCONTINUED | OUTPATIENT
Start: 2020-06-12 | End: 2020-06-13 | Stop reason: HOSPADM

## 2020-06-12 RX ORDER — ACETAMINOPHEN 325 MG/1
650 TABLET ORAL EVERY 4 HOURS PRN
Status: DISCONTINUED | OUTPATIENT
Start: 2020-06-12 | End: 2020-06-13 | Stop reason: HOSPADM

## 2020-06-12 RX ORDER — SODIUM CHLORIDE 9 MG/ML
INJECTION, SOLUTION INTRAVENOUS CONTINUOUS PRN
Status: COMPLETED | OUTPATIENT
Start: 2020-06-12 | End: 2020-06-12

## 2020-06-12 RX ORDER — SODIUM CHLORIDE 9 MG/ML
75 INJECTION, SOLUTION INTRAVENOUS CONTINUOUS
Status: DISCONTINUED | OUTPATIENT
Start: 2020-06-12 | End: 2020-06-13

## 2020-06-12 RX ORDER — ACETYLCYSTEINE 200 MG/ML
3 SOLUTION ORAL; RESPIRATORY (INHALATION) ONCE
Status: COMPLETED | OUTPATIENT
Start: 2020-06-12 | End: 2020-06-12

## 2020-06-12 RX ORDER — FENTANYL CITRATE 50 UG/ML
INJECTION, SOLUTION INTRAMUSCULAR; INTRAVENOUS AS NEEDED
Status: DISCONTINUED | OUTPATIENT
Start: 2020-06-12 | End: 2020-06-12 | Stop reason: HOSPADM

## 2020-06-12 RX ORDER — LIDOCAINE HYDROCHLORIDE 20 MG/ML
INJECTION, SOLUTION INFILTRATION; PERINEURAL AS NEEDED
Status: DISCONTINUED | OUTPATIENT
Start: 2020-06-12 | End: 2020-06-12 | Stop reason: HOSPADM

## 2020-06-12 RX ADMIN — MIRTAZAPINE 30 MG: 15 TABLET, FILM COATED ORAL at 20:17

## 2020-06-12 RX ADMIN — METOPROLOL TARTRATE 25 MG: 25 TABLET, FILM COATED ORAL at 08:34

## 2020-06-12 RX ADMIN — ACETYLCYSTEINE 3 ML: 200 SOLUTION ORAL; RESPIRATORY (INHALATION) at 19:33

## 2020-06-12 RX ADMIN — DICYCLOMINE HYDROCHLORIDE 10 MG: 10 CAPSULE ORAL at 20:16

## 2020-06-12 RX ADMIN — Medication 10 ML: at 08:34

## 2020-06-12 RX ADMIN — ASPIRIN 81 MG: 81 TABLET, COATED ORAL at 08:34

## 2020-06-12 RX ADMIN — PREDNISONE 40 MG: 20 TABLET ORAL at 08:34

## 2020-06-12 RX ADMIN — SODIUM CHLORIDE 75 ML/HR: 900 INJECTION, SOLUTION INTRAVENOUS at 17:56

## 2020-06-12 RX ADMIN — DICYCLOMINE HYDROCHLORIDE 10 MG: 10 CAPSULE ORAL at 17:56

## 2020-06-12 RX ADMIN — BUDESONIDE 0.5 MG: 0.5 INHALANT RESPIRATORY (INHALATION) at 19:34

## 2020-06-12 RX ADMIN — IPRATROPIUM BROMIDE AND ALBUTEROL SULFATE 3 ML: .5; 3 SOLUTION RESPIRATORY (INHALATION) at 19:34

## 2020-06-12 RX ADMIN — GUAIFENESIN 1200 MG: 600 TABLET, EXTENDED RELEASE ORAL at 20:17

## 2020-06-12 RX ADMIN — ATORVASTATIN CALCIUM 20 MG: 20 TABLET, FILM COATED ORAL at 08:34

## 2020-06-12 RX ADMIN — IPRATROPIUM BROMIDE AND ALBUTEROL SULFATE 3 ML: .5; 3 SOLUTION RESPIRATORY (INHALATION) at 14:22

## 2020-06-12 RX ADMIN — DICYCLOMINE HYDROCHLORIDE 10 MG: 10 CAPSULE ORAL at 08:34

## 2020-06-12 RX ADMIN — PAROXETINE HYDROCHLORIDE 20 MG: 20 TABLET, FILM COATED ORAL at 05:59

## 2020-06-12 RX ADMIN — IPRATROPIUM BROMIDE AND ALBUTEROL SULFATE 3 ML: .5; 3 SOLUTION RESPIRATORY (INHALATION) at 23:42

## 2020-06-12 NOTE — PLAN OF CARE
Problem: Patient Care Overview  Goal: Plan of Care Review  Outcome: Ongoing (interventions implemented as appropriate)  Flowsheets  Taken 6/12/2020 0443  Progress: no change  Taken 6/11/2020 1901  Plan of Care Reviewed With: patient  Note:   Patient rested comfortably in bed throughout the night without complaints of pain.  Patient scheduled for heart cath in the morning.  No new concerns at this time, will continue to monitor.

## 2020-06-12 NOTE — PROGRESS NOTES
Discharge Planning Assessment   [facility]     Patient Name: Jocelin Wills  MRN: [id number]  Today's Date: 6/12/2020    Admit Date: 6/9/2020          Plan    Plan Comments  pending cardiac cath and patient remains on oxygen at 2 l min       Carol naegele rn  Case management  Office number [phone]  Cell phone [phone]

## 2020-06-12 NOTE — PROGRESS NOTES
"      [facility] Medicine Services Daily Progress Note      Hospitalist Team  LOS 1 days      Patient Care Team:  Stanley French MD as PCP - General (Family Medicine)    Patient Location: Pool/[facility]      Subjective   Subjective     Chief Complaint / Subjective  Chief Complaint   Patient presents with   • Shortness of Breath     RA sat of 74% on EMS arrival, 94% on duoneb currnetly upon arrival      Patient's breathing improved today.  Patient able to wean down on oxygen.  Patient going for cardiac cath.    Brief Synopsis of Hospital Course/HPI  60 year old female with PMH COPD and continued tobacco use , CAD s/p PCI presents with complaints of increased shortness of air past few days. She denies nausea, chest pain, dizziness, fever , cough or known Covid-19 exposure. Her oxygen sats were in 70-80s in ED which improved with IV steroids , duo neb treatments and oxygen via nasal canula, CXR per radiology:     \"IMPRESSION:  Stable appearance of the chest. No active cardiopulmonary disease.  Chronic pleural thickening versus small chronic or recurrent pleural  effusions are noted.'     She is afebrile , Hgb 17.5, wbc 5.3, Na 127. EKG and troponin have been subsequently ordered and pending . She will be admitted for COPD exacerbation. PMH includes colon cancer, hyperlipidemia, depression, chronic hypertension, IBS. She denies known Covid-19 exposure .    6/10/2020: Patient reports feeling much better since initiation of nebulizers and steroids.  Breathing more comfortably and minimal coughing.  Patient reports that she has had some intermittent chest pain as well in the last week along with exertional dyspnea.  Patient has cardiologist but has not seen in multiple years.  When patient on room air after walking to bathroom she desatted into the 70s.    6/11/2020: Patient reports feeling the same as yesterday.  At rest no shortness of breath but continues to desat on room air.  Patient requiring 4 " "L of oxygen today, denies any further chest pain.  Awaiting results of stress test.  Trial of NAC    Date::          Review of Systems   Constitution: Positive for malaise/fatigue. Negative for chills and fever.   HENT: Negative for hoarse voice and stridor.    Eyes: Negative for double vision and photophobia.   Cardiovascular: Negative for chest pain, irregular heartbeat and syncope.   Respiratory: Positive for shortness of breath and wheezing. Negative for cough and sputum production.    Musculoskeletal: Negative for falls and muscle weakness.   Gastrointestinal: Negative for nausea and vomiting.   Genitourinary: Negative for dysuria and flank pain.   Neurological: Negative for headaches and loss of balance.   Psychiatric/Behavioral: Negative for altered mental status. The patient is not nervous/anxious.          Objective   Objective      Vital Signs  Temp:  [97.7 °F (36.5 °C)-98.2 °F (36.8 °C)] 98.2 °F (36.8 °C)  Heart Rate:  [70-84] 74  Resp:  [14-20] 14  BP: (101-142)/(68-97) 101/68  Oxygen Therapy  SpO2: 95 %  Pulse Oximetry Type: Continuous  Device (Oxygen Therapy): nasal cannula  Device (Oxygen Therapy): nasal cannula  Flow (L/min): 3  Oxygen Concentration (%): 99  Flowsheet Rows      First Filed Value   Admission Height  165.1 cm (65\") Documented at 06/09/2020 2011   Admission Weight  52.2 kg (115 lb) Documented at 06/09/2020 2011        Intake & Output (last 3 days)       06/09 0701 - 06/10 0700 06/10 0701 - 06/11 0700 06/11 0701 - 06/12 0700 06/12 0701 - 06/13 0700    P.O.  880 480     Total Intake(mL/kg)  880 (16.9) 480 (8.9)     Net  +880 +480                 Lines, Drains & Airways    Active LDAs     Name:   Placement date:   Placement time:   Site:   Days:    Peripheral IV 06/09/20 2026 Left Antecubital   06/09/20 2026    Antecubital   less than 1                  Physical Exam:    Physical Exam    General: Slender elderly female lying in bed breathing comfortably on 2 L via nasal cannula no acute " distress  HEENT: NC/AT, EOMI, mucosa moist  Heart: Noticed, rate controlled  Chest: Normal work of breathing, increased air movement, expiratory wheezing improved  Abdominal: Soft. NT/ND.   Musculoskeletal: Normal ROM.  No edema. No calf tenderness.  Neurological: AAOx3, no focal deficits  Skin: Skin is warm and dry. No rash  Psychiatric: Normal mood and affect.        Procedures:              Results Review:     I reviewed the patient's new clinical results.      Lab Results (last 24 hours)     Procedure Component Value Units Date/Time    COVID PRE-OP / PRE-PROCEDURE SCREENING ORDER (NO ISOLATION) - Swab, Nasopharynx [[id number]] Collected:  06/11/20 2158    Specimen:  Swab from Nasopharynx Updated:  06/12/20 0827    Narrative:       The following orders were created for panel order COVID PRE-OP / PRE-PROCEDURE SCREENING ORDER (NO ISOLATION) - Swab, Nasopharynx.  Procedure                               Abnormality         Status                     ---------                               -----------         ------                     COVID-19,CEPHEID,COR/JEMIMA...[[id number]]  Normal              Final result                 Please view results for these tests on the individual orders.    COVID-19,CEPHEID,COR/JEMIMA/PAD IN-HOUSE(OR EMERGENT/ADD-ON),NP SWAB IN TRANSPORT MEDIA 3-4 HR TAT - Swab, Nasopharynx [[id number]]  (Normal) Collected:  06/11/20 2158    Specimen:  Swab from Nasopharynx Updated:  06/12/20 0827     COVID19 Not Detected    BUN [[id number]]  (Normal) Collected:  06/12/20 0208    Specimen:  Blood Updated:  06/12/20 0740     BUN 20 mg/dL     Basic Metabolic Panel [[id number]]  (Abnormal) Collected:  06/12/20 0208    Specimen:  Blood Updated:  06/12/20 0409     Glucose 86 mg/dL      BUN --     Comment: Testing performed by alternate method        Creatinine 0.52 mg/dL      Sodium 138 mmol/L      Potassium 4.4 mmol/L      Chloride 93 mmol/L      CO2 39.0 mmol/L      Calcium 9.0 mg/dL      eGFR Non African Amer 120  mL/min/1.73      BUN/Creatinine Ratio --     Comment: Testing not performed.        Anion Gap 6.0 mmol/L     Narrative:       GFR Normal >60  Chronic Kidney Disease <60  Kidney Failure <15      Magnesium [[id number]]  (Normal) Collected:  06/12/20 0208    Specimen:  Blood Updated:  06/12/20 0409     Magnesium 1.6 mg/dL     CBC & Differential [[id number]] Collected:  06/12/20 0208    Specimen:  Blood Updated:  06/12/20 0342    Narrative:       The following orders were created for panel order CBC & Differential.  Procedure                               Abnormality         Status                     ---------                               -----------         ------                     CBC Auto Differential[[id number]]        Abnormal            Final result                 Please view results for these tests on the individual orders.    CBC Auto Differential [[id number]]  (Abnormal) Collected:  06/12/20 0208    Specimen:  Blood Updated:  06/12/20 0342     WBC 5.90 10*3/mm3      RBC 5.02 10*6/mm3      Hemoglobin 15.1 g/dL      Hematocrit 46.0 %      MCV 91.6 fL      MCH 30.0 pg      MCHC 32.8 g/dL      RDW 14.4 %      RDW-SD 46.4 fl      MPV 6.7 fL      Platelets 240 10*3/mm3      Neutrophil % 62.6 %      Lymphocyte % 23.9 %      Monocyte % 13.1 %      Eosinophil % 0.0 %      Basophil % 0.4 %      Neutrophils, Absolute 3.70 10*3/mm3      Lymphocytes, Absolute 1.40 10*3/mm3      Monocytes, Absolute 0.80 10*3/mm3      Eosinophils, Absolute 0.00 10*3/mm3      Basophils, Absolute 0.00 10*3/mm3      nRBC 0.3 /100 WBC     BUN [[id number]]  (Normal) Collected:  06/11/20 1850    Specimen:  Blood Updated:  06/12/20 0206     BUN 20 mg/dL     Basic Metabolic Panel [[id number]]  (Abnormal) Collected:  06/11/20 1850    Specimen:  Blood Updated:  06/11/20 1950     Glucose 188 mg/dL      BUN --     Comment: Testing performed by alternate method        Creatinine 0.62 mg/dL      Sodium 139 mmol/L      Potassium 4.4 mmol/L      Chloride 95  mmol/L      CO2 37.0 mmol/L      Calcium 8.9 mg/dL      eGFR Non African Amer 98 mL/min/1.73      BUN/Creatinine Ratio --     Comment: Testing not performed.        Anion Gap 7.0 mmol/L     Narrative:       GFR Normal >60  Chronic Kidney Disease <60  Kidney Failure <15      CBC & Differential [[id number]] Collected:  06/11/20 1850    Specimen:  Blood Updated:  06/11/20 1924    Narrative:       The following orders were created for panel order CBC & Differential.  Procedure                               Abnormality         Status                     ---------                               -----------         ------                     CBC Auto Differential[[id number]]        Abnormal            Final result                 Please view results for these tests on the individual orders.    CBC Auto Differential [[id number]]  (Abnormal) Collected:  06/11/20 1850    Specimen:  Blood Updated:  06/11/20 1924     WBC 5.50 10*3/mm3      RBC 4.98 10*6/mm3      Hemoglobin 15.5 g/dL      Hematocrit 46.3 %      MCV 92.9 fL      MCH 31.2 pg      MCHC 33.6 g/dL      RDW 14.9 %      RDW-SD 48.1 fl      MPV 6.9 fL      Platelets 249 10*3/mm3      Neutrophil % 83.5 %      Lymphocyte % 7.6 %      Monocyte % 8.8 %      Eosinophil % 0.0 %      Basophil % 0.1 %      Neutrophils, Absolute 4.60 10*3/mm3      Lymphocytes, Absolute 0.40 10*3/mm3      Monocytes, Absolute 0.50 10*3/mm3      Eosinophils, Absolute 0.00 10*3/mm3      Basophils, Absolute 0.00 10*3/mm3      nRBC 0.0 /100 WBC         No results found for: HGBA1C        Results from last 7 days   Lab Units 06/09/20  2108   PH, ARTERIAL pH units 7.424   PO2 ART mm Hg 60.5*   PCO2, ARTERIAL mm Hg 55.0*   HCO3 ART mmol/L 36.0*     No results found for: LIPASE  No results found for: CHOL, CHLPL, TRIG, HDL, LDL, LDLDIRECT    No results found for: INTRAOP, PREDX, FINALDX, COMDX    Microbiology Results (last 10 days)     Procedure Component Value - Date/Time    COVID PRE-OP / PRE-PROCEDURE  SCREENING ORDER (NO ISOLATION) - Swab, Nasopharynx [[id number]] Collected:  06/11/20 2158    Lab Status:  Final result Specimen:  Swab from Nasopharynx Updated:  06/12/20 0827    Narrative:       The following orders were created for panel order COVID PRE-OP / PRE-PROCEDURE SCREENING ORDER (NO ISOLATION) - Swab, Nasopharynx.  Procedure                               Abnormality         Status                     ---------                               -----------         ------                     COVID-19,CEPHEID,COR/JEMIMA...[[id number]]  Normal              Final result                 Please view results for these tests on the individual orders.    COVID-19,CEPHEID,COR/JEMIMA/PAD IN-HOUSE(OR EMERGENT/ADD-ON),NP SWAB IN TRANSPORT MEDIA 3-4 HR TAT - Swab, Nasopharynx [[id number]]  (Normal) Collected:  06/11/20 2158    Lab Status:  Final result Specimen:  Swab from Nasopharynx Updated:  06/12/20 0827     COVID19 Not Detected          ECG/EMG Results (most recent)     Procedure Component Value Units Date/Time    ECG 12 Lead [[id number]] Collected:  06/09/20 2024     Updated:  06/11/20 1628    Narrative:       HEART RATE= 95  bpm  RR Interval= 632  ms  HI Interval= 122  ms  P Horizontal Axis= -9  deg  P Front Axis= 82  deg  QRSD Interval= 78  ms  QT Interval= 358  ms  QRS Axis= -9  deg  T Wave Axis= 15  deg  - BORDERLINE ECG -  Sinus rhythm  Biatrial enlargement  When compared with ECG of 20-Dec-2018 10:53:52,  Significant change in rhythm  Significant axis, voltage or hypertrophy change  Electronically Signed By: Osvaldo Lane (Jemima) 11-Jun-2020 16:26:49  Date and Time of Study: 2020-06-09 20:24:23                    Xr Chest 1 View    Result Date: 6/9/2020  Stable appearance of the chest. No active cardiopulmonary disease. Chronic pleural thickening versus small chronic or recurrent pleural effusions are noted.   Electronically Signed By-Kyree Black DO. On:6/9/2020 10:29 PM This report was finalized on [id number] by   Kyree Black DO..    Ct Chest Pulmonary Embolism    Result Date: 6/10/2020   1. No pulmonary embolus. 2. Mild emphysema, unchanged. There is also mild bronchial wall thickening diffusely compatible with chronic or acute bronchitis. It is a change since 2016. Stable postsurgical changes from partial right lower lobectomy.  Electronically Signed By-Kyree Black DO. On:6/10/2020 4:45 PM This report was finalized on [id number] by  Kyree Black DO..          Xrays, labs reviewed personally by physician.    Medication Review:   I have reviewed the patient's current medication list      Scheduled Meds    [MAR Hold] aspirin 81 mg Oral Daily   [MAR Hold] atorvastatin 20 mg Oral Daily   [MAR Hold] budesonide 0.5 mg Nebulization BID - RT   [MAR Hold] dicyclomine 10 mg Oral 4x Daily   [MAR Hold] guaiFENesin 1,200 mg Oral Q12H   [MAR Hold] ipratropium-albuterol 3 mL Nebulization Q4H - RT   metoprolol tartrate 25 mg Oral Daily   [MAR Hold] mirtazapine 30 mg Oral Nightly   [MAR Hold] PARoxetine 20 mg Oral QAM   [MAR Hold] predniSONE 40 mg Oral Daily With Breakfast       Meds Infusions       Meds PRN  •  [MAR Hold] albuterol  •  [MAR Hold] guaiFENesin-dextromethorphan  •  [MAR Hold] hydrOXYzine  •  [MAR Hold] ipratropium-albuterol  •  [MAR Hold] ipratropium-albuterol  •  [COMPLETED] Insert peripheral IV **AND** [MAR Hold] sodium chloride        Assessment/Plan   Assessment/Plan     Active Hospital Problems    Diagnosis  POA   • Angina at rest (CMS/HCC) [I20.8]  Unknown   • Abnormal nuclear stress test [R94.39]  Unknown   • Dyspnea [R06.00]  Yes   • Acute on chronic respiratory failure with hypoxia (CMS/HCC) [J96.21]  Yes      Resolved Hospital Problems   No resolved problems to display.       MEDICAL DECISION MAKING COMPLEXITY BY PROBLEM:     Shortness of breath -may be multifactorial.  Patient's history of tobacco use and wheezing consistent with COPD exacerbation.  However patient also has cardiac history and noted  chest pain with exertional dyspnea  -Check troponins periodically  -Mucolytic, addition of NAC  -Nebulizers  -Steroids  -No sputum present  -Stress test results showing significant area of ischemia  -Cardiology consulted  -Follow-up results of cardiac cath today  -CT PE negative    Hyponatremia -possible ADH mediated stress response, improved from admission of 127, now normalized  -Monitor daily  -Patient appears euvolemic    Polycythemia -secondary most likely due to smoking history  -Monitor daily    Coronary artery disease -with history of MI has not seen cardiology in multiple years  -Check troponins  -Telemetry  -Follow-up stress test results.    Hypertension/depression/hormone replacement/IBS -chronic in nature  -Hold hormones  -Resume other medications clinically appropriate     VTE Prophylaxis -   Mechanical Order History:      Ordered        06/09/20 2233  Place Sequential Compression Device  Once         06/09/20 2233  Maintain Sequential Compression Device  Continuous                 Pharmalogical Order History:     None            Code Status -   Code Status and Medical Interventions:   Ordered at: 06/09/20 2154     Code Status:    CPR     Medical Interventions (Level of Support Prior to Arrest):    Full           Discharge Planning          Destination      Coordination has not been started for this encounter.      Durable Medical Equipment      Coordination has not been started for this encounter.      Dialysis/Infusion      Coordination has not been started for this encounter.      Home Medical Care      Coordination has not been started for this encounter.      Therapy      Coordination has not been started for this encounter.      Community Resources      Coordination has not been started for this encounter.            Electronically signed by Peter Eduardo MD, 06/12/20, 16:30.  [facility]ist Team

## 2020-06-12 NOTE — PROGRESS NOTES
"Referring Provider: Hospitalist    Reason for follow-up: Chest pain and shortness of breath     Patient Care Team:  Stanley French MD as PCP - General (Family Medicine)    Subjective .  No chest pain but has shortness of breath    Objective  Lying in bed comfortable     Review of Systems   Constitution: Negative for fever and malaise/fatigue.   Cardiovascular: Negative for chest pain, dyspnea on exertion and palpitations.   Respiratory: Positive for shortness of breath. Negative for cough.    Skin: Negative for rash.   Gastrointestinal: Negative for abdominal pain, nausea and vomiting.   Neurological: Negative for focal weakness and headaches.   All other systems reviewed and are negative.      Latex    Scheduled Meds:    [MAR Hold] aspirin 81 mg Oral Daily   [MAR Hold] atorvastatin 20 mg Oral Daily   [MAR Hold] budesonide 0.5 mg Nebulization BID - RT   [MAR Hold] dicyclomine 10 mg Oral 4x Daily   [MAR Hold] guaiFENesin 1,200 mg Oral Q12H   [MAR Hold] ipratropium-albuterol 3 mL Nebulization Q4H - RT   metoprolol tartrate 25 mg Oral Daily   [MAR Hold] mirtazapine 30 mg Oral Nightly   [MAR Hold] PARoxetine 20 mg Oral QAM   [MAR Hold] predniSONE 40 mg Oral Daily With Breakfast     Continuous Infusions:   PRN Meds:.•  [MAR Hold] albuterol  •  [MAR Hold] guaiFENesin-dextromethorphan  •  [MAR Hold] hydrOXYzine  •  [MAR Hold] ipratropium-albuterol  •  [MAR Hold] ipratropium-albuterol  •  [COMPLETED] Insert peripheral IV **AND** [MAR Hold] sodium chloride        VITAL SIGNS  Vitals:    06/12/20 1058 06/12/20 1422 06/12/20 1425 06/12/20 1617   BP: 119/70   101/68   BP Location: Right arm      Patient Position: Lying      Pulse: 71 70 71 74   Resp: 14 15 14 14   Temp: 98.2 °F (36.8 °C)      TempSrc: Oral      SpO2: 93% 97%  95%   Weight:       Height:           Flowsheet Rows      First Filed Value   Admission Height  165.1 cm (65\") Documented at 06/09/2020 2011   Admission Weight  52.2 kg (115 lb) Documented at " 06/09/2020 2011           TELEMETRY: Sinus rhythm with nonspecific ST segment    Physical Exam:  Physical Exam   Constitutional: She appears well-developed and well-nourished.   HENT:   Head: Normocephalic and atraumatic.   Eyes: Conjunctivae are normal. No scleral icterus.   Neck: Normal range of motion. Neck supple. No JVD present. Carotid bruit is not present.   Cardiovascular: Normal rate, regular rhythm, S1 normal, S2 normal, normal heart sounds and intact distal pulses. PMI is not displaced.   Pulmonary/Chest: Effort normal and breath sounds normal. She has no wheezes. She has no rales.   Abdominal: Soft. Bowel sounds are normal.   Neurological: She is alert. She has normal strength.   Skin: Skin is warm and dry. No rash noted.        Results Review:   I reviewed the patient's new clinical results.  Lab Results (last 24 hours)     Procedure Component Value Units Date/Time    COVID PRE-OP / PRE-PROCEDURE SCREENING ORDER (NO ISOLATION) - Swab, Nasopharynx [[id number]] Collected:  06/11/20 2158    Specimen:  Swab from Nasopharynx Updated:  06/12/20 0827    Narrative:       The following orders were created for panel order COVID PRE-OP / PRE-PROCEDURE SCREENING ORDER (NO ISOLATION) - Swab, Nasopharynx.  Procedure                               Abnormality         Status                     ---------                               -----------         ------                     COVID-19,CEPHEID,COR/JEMIMA...[[id number]]  Normal              Final result                 Please view results for these tests on the individual orders.    COVID-19,CEPHEID,COR/JEMIMA/PAD IN-HOUSE(OR EMERGENT/ADD-ON),NP SWAB IN TRANSPORT MEDIA 3-4 HR TAT - Swab, Nasopharynx [[id number]]  (Normal) Collected:  06/11/20 2158    Specimen:  Swab from Nasopharynx Updated:  06/12/20 0827     COVID19 Not Detected    BUN [[id number]]  (Normal) Collected:  06/12/20 0208    Specimen:  Blood Updated:  06/12/20 0740     BUN 20 mg/dL     Basic Metabolic Panel  [[id number]]  (Abnormal) Collected:  06/12/20 0208    Specimen:  Blood Updated:  06/12/20 0409     Glucose 86 mg/dL      BUN --     Comment: Testing performed by alternate method        Creatinine 0.52 mg/dL      Sodium 138 mmol/L      Potassium 4.4 mmol/L      Chloride 93 mmol/L      CO2 39.0 mmol/L      Calcium 9.0 mg/dL      eGFR Non African Amer 120 mL/min/1.73      BUN/Creatinine Ratio --     Comment: Testing not performed.        Anion Gap 6.0 mmol/L     Narrative:       GFR Normal >60  Chronic Kidney Disease <60  Kidney Failure <15      Magnesium [[id number]]  (Normal) Collected:  06/12/20 0208    Specimen:  Blood Updated:  06/12/20 0409     Magnesium 1.6 mg/dL     CBC & Differential [[id number]] Collected:  06/12/20 0208    Specimen:  Blood Updated:  06/12/20 0342    Narrative:       The following orders were created for panel order CBC & Differential.  Procedure                               Abnormality         Status                     ---------                               -----------         ------                     CBC Auto Differential[[id number]]        Abnormal            Final result                 Please view results for these tests on the individual orders.    CBC Auto Differential [[id number]]  (Abnormal) Collected:  06/12/20 0208    Specimen:  Blood Updated:  06/12/20 0342     WBC 5.90 10*3/mm3      RBC 5.02 10*6/mm3      Hemoglobin 15.1 g/dL      Hematocrit 46.0 %      MCV 91.6 fL      MCH 30.0 pg      MCHC 32.8 g/dL      RDW 14.4 %      RDW-SD 46.4 fl      MPV 6.7 fL      Platelets 240 10*3/mm3      Neutrophil % 62.6 %      Lymphocyte % 23.9 %      Monocyte % 13.1 %      Eosinophil % 0.0 %      Basophil % 0.4 %      Neutrophils, Absolute 3.70 10*3/mm3      Lymphocytes, Absolute 1.40 10*3/mm3      Monocytes, Absolute 0.80 10*3/mm3      Eosinophils, Absolute 0.00 10*3/mm3      Basophils, Absolute 0.00 10*3/mm3      nRBC 0.3 /100 WBC     BUN [[id number]]  (Normal) Collected:  06/11/20 1115     Specimen:  Blood Updated:  06/12/20 0206     BUN 20 mg/dL     Basic Metabolic Panel [[id number]]  (Abnormal) Collected:  06/11/20 1850    Specimen:  Blood Updated:  06/11/20 1950     Glucose 188 mg/dL      BUN --     Comment: Testing performed by alternate method        Creatinine 0.62 mg/dL      Sodium 139 mmol/L      Potassium 4.4 mmol/L      Chloride 95 mmol/L      CO2 37.0 mmol/L      Calcium 8.9 mg/dL      eGFR Non African Amer 98 mL/min/1.73      BUN/Creatinine Ratio --     Comment: Testing not performed.        Anion Gap 7.0 mmol/L     Narrative:       GFR Normal >60  Chronic Kidney Disease <60  Kidney Failure <15      CBC & Differential [[id number]] Collected:  06/11/20 1850    Specimen:  Blood Updated:  06/11/20 1924    Narrative:       The following orders were created for panel order CBC & Differential.  Procedure                               Abnormality         Status                     ---------                               -----------         ------                     CBC Auto Differential[[id number]]        Abnormal            Final result                 Please view results for these tests on the individual orders.    CBC Auto Differential [[id number]]  (Abnormal) Collected:  06/11/20 1850    Specimen:  Blood Updated:  06/11/20 1924     WBC 5.50 10*3/mm3      RBC 4.98 10*6/mm3      Hemoglobin 15.5 g/dL      Hematocrit 46.3 %      MCV 92.9 fL      MCH 31.2 pg      MCHC 33.6 g/dL      RDW 14.9 %      RDW-SD 48.1 fl      MPV 6.9 fL      Platelets 249 10*3/mm3      Neutrophil % 83.5 %      Lymphocyte % 7.6 %      Monocyte % 8.8 %      Eosinophil % 0.0 %      Basophil % 0.1 %      Neutrophils, Absolute 4.60 10*3/mm3      Lymphocytes, Absolute 0.40 10*3/mm3      Monocytes, Absolute 0.50 10*3/mm3      Eosinophils, Absolute 0.00 10*3/mm3      Basophils, Absolute 0.00 10*3/mm3      nRBC 0.0 /100 WBC           Imaging Results (Last 24 Hours)     ** No results found for the last 24 hours. **          EKG      I  personally viewed and interpreted the patient's EKG/Telemetry data:    ECHOCARDIOGRAM:    STRESS MYOVIEW:    CARDIAC CATHETERIZATION:    OTHER:         Assessment/Plan     Active Problems:    Angina at rest (CMS/HCC)    Abnormal nuclear stress test    Dyspnea    Acute on chronic respiratory failure with hypoxia (CMS/HCC)  History of coronary disease status post MI and stent placement  Hypertension  Hyperlipidemia    Patient presented with chest pain and had abnormal Myoview study  Patient had a cardiac catheterization which showed nonobstructive coronary disease  Blood pressure and heart rate are stable  Continue current medical therapy  Patient has chronic COPD with hypoxic respiratory failure and is being treated by the pulmonologist  Patient will be followed in the office as an outpatient    I discussed the patients findings and my recommendations with patient and nurse    Jose Paredes MD  06/12/20  16:29

## 2020-06-12 NOTE — PLAN OF CARE
Problem: Patient Care Overview  Goal: Plan of Care Review  Outcome: Ongoing (interventions implemented as appropriate)  Flowsheets  Taken 6/12/2020 0443 by Ilana Deng, RN  Progress: no change  Taken 6/12/2020 0715 by Randa Karimi, RN  Plan of Care Reviewed With: patient  Note:   Pt went for heart cath today, no stent placement needed. No complaints of chest pain or shortness of breath. Pt still concerned about what is wrong with her lungs. Will continue to monitor

## 2020-06-13 VITALS
OXYGEN SATURATION: 97 % | WEIGHT: 118.39 LBS | HEART RATE: 74 BPM | SYSTOLIC BLOOD PRESSURE: 113 MMHG | DIASTOLIC BLOOD PRESSURE: 70 MMHG | TEMPERATURE: 98 F | RESPIRATION RATE: 16 BRPM | BODY MASS INDEX: 19.72 KG/M2 | HEIGHT: 65 IN

## 2020-06-13 PROCEDURE — 99238 HOSP IP/OBS DSCHRG MGMT 30/<: CPT | Performed by: FAMILY MEDICINE

## 2020-06-13 PROCEDURE — 94799 UNLISTED PULMONARY SVC/PX: CPT

## 2020-06-13 PROCEDURE — 97162 PT EVAL MOD COMPLEX 30 MIN: CPT

## 2020-06-13 PROCEDURE — 63710000001 PREDNISONE PER 1 MG: Performed by: INTERNAL MEDICINE

## 2020-06-13 RX ORDER — PREDNISONE 20 MG/1
40 TABLET ORAL
Qty: 6 TABLET | Refills: 0 | Status: SHIPPED | OUTPATIENT
Start: 2020-06-14 | End: 2020-06-17

## 2020-06-13 RX ORDER — ASPIRIN 81 MG/1
81 TABLET ORAL DAILY
Qty: 30 TABLET | Refills: 0 | Status: SHIPPED | OUTPATIENT
Start: 2020-06-14 | End: 2020-07-14

## 2020-06-13 RX ADMIN — IPRATROPIUM BROMIDE AND ALBUTEROL SULFATE 3 ML: .5; 3 SOLUTION RESPIRATORY (INHALATION) at 06:33

## 2020-06-13 RX ADMIN — BUDESONIDE 0.5 MG: 0.5 INHALANT RESPIRATORY (INHALATION) at 18:10

## 2020-06-13 RX ADMIN — PREDNISONE 40 MG: 20 TABLET ORAL at 09:21

## 2020-06-13 RX ADMIN — METOPROLOL TARTRATE 25 MG: 25 TABLET, FILM COATED ORAL at 09:21

## 2020-06-13 RX ADMIN — IPRATROPIUM BROMIDE AND ALBUTEROL SULFATE 3 ML: .5; 3 SOLUTION RESPIRATORY (INHALATION) at 03:43

## 2020-06-13 RX ADMIN — IPRATROPIUM BROMIDE AND ALBUTEROL SULFATE 3 ML: .5; 3 SOLUTION RESPIRATORY (INHALATION) at 10:28

## 2020-06-13 RX ADMIN — ATORVASTATIN CALCIUM 20 MG: 20 TABLET, FILM COATED ORAL at 09:21

## 2020-06-13 RX ADMIN — DICYCLOMINE HYDROCHLORIDE 10 MG: 10 CAPSULE ORAL at 09:21

## 2020-06-13 RX ADMIN — IPRATROPIUM BROMIDE AND ALBUTEROL SULFATE 3 ML: .5; 3 SOLUTION RESPIRATORY (INHALATION) at 18:11

## 2020-06-13 RX ADMIN — PAROXETINE HYDROCHLORIDE 20 MG: 20 TABLET, FILM COATED ORAL at 06:15

## 2020-06-13 RX ADMIN — Medication 10 ML: at 09:21

## 2020-06-13 RX ADMIN — GUAIFENESIN 1200 MG: 600 TABLET, EXTENDED RELEASE ORAL at 09:21

## 2020-06-13 RX ADMIN — SODIUM CHLORIDE 75 ML/HR: 900 INJECTION, SOLUTION INTRAVENOUS at 09:21

## 2020-06-13 RX ADMIN — BUDESONIDE 0.5 MG: 0.5 INHALANT RESPIRATORY (INHALATION) at 06:33

## 2020-06-13 RX ADMIN — ASPIRIN 81 MG: 81 TABLET, COATED ORAL at 09:21

## 2020-06-13 RX ADMIN — DICYCLOMINE HYDROCHLORIDE 10 MG: 10 CAPSULE ORAL at 12:25

## 2020-06-13 NOTE — PROGRESS NOTES
Exercise Oximetry    Patient Name:Jocelin Wills   MRN: [id number]   Date: 06/13/20             ROOM AIR BASELINE   SpO2% 82% room air   Heart Rate    Blood Pressure      EXERCISE ON ROOM AIR SpO2% EXERCISE ON O2 @  LPM SpO2%   1 MINUTE  1 MINUTE    2 MINUTES  2 MINUTES    3 MINUTES  3 MINUTES    4 MINUTES  4 MINUTES    5 MINUTES  5 MINUTES    6 MINUTES  6 MINUTES               Distance Walked   Distance Walked   Dyspnea (Evan Scale)   Dyspnea (Evan Scale)   Fatigue (Evan Scale)   Fatigue (Evan Scale)   SpO2% Post Exercise   SpO2% Post Exercise   HR Post Exercise   HR Post Exercise   Time to Recovery   Time to Recovery     Comments: patient's sat came up to 94% on 3L nasal cannula.

## 2020-06-13 NOTE — PLAN OF CARE
Problem: Patient Care Overview  Goal: Plan of Care Review  Outcome: Ongoing (interventions implemented as appropriate)  Flowsheets  Taken 6/13/2020 1614  Progress: no change  Outcome Summary: pt cooperative and no complaints. pt is on 3L O2. PT and 6 minute walk today. a&ox4. will continue to monitor.  Taken 6/13/2020 0701  Plan of Care Reviewed With: patient  Goal: Individualization and Mutuality  Outcome: Ongoing (interventions implemented as appropriate)  Goal: Discharge Needs Assessment  Outcome: Ongoing (interventions implemented as appropriate)  Goal: Interprofessional Rounds/Family Conf  Outcome: Ongoing (interventions implemented as appropriate)     Problem: Pain, Chronic (Adult)  Goal: Identify Related Risk Factors and Signs and Symptoms  Outcome: Ongoing (interventions implemented as appropriate)  Goal: Acceptable Pain/Comfort Level and Functional Ability  Outcome: Ongoing (interventions implemented as appropriate)     Problem: Breathing Pattern Ineffective (Adult)  Goal: Identify Related Risk Factors and Signs and Symptoms  Outcome: Ongoing (interventions implemented as appropriate)  Goal: Effective Oxygenation/Ventilation  Outcome: Ongoing (interventions implemented as appropriate)  Goal: Anxiety/Fear Reduction  Outcome: Ongoing (interventions implemented as appropriate)     Problem: Chronic Obstructive Pulmonary Disease (Adult)  Goal: Signs and Symptoms of Listed Potential Problems Will be Absent, Minimized or Managed (Chronic Obstructive Pulmonary Disease)  Outcome: Ongoing (interventions implemented as appropriate)

## 2020-06-13 NOTE — THERAPY EVALUATION
Patient Name: Jocelin Wills  : 1960    MRN: [id number]                              Today's Date: 2020       Admit Date: 2020    Visit Dx:     ICD-10-CM ICD-9-CM   1. Dyspnea, unspecified type R06.00 786.09   2. COPD exacerbation (CMS/HCC) J44.1 491.21   3. Angina at rest (CMS/HCC) I20.8 413.9   4. Abnormal nuclear stress test R94.39 794.39     Patient Active Problem List   Diagnosis   • Dyspnea   • Acute on chronic respiratory failure with hypoxia (CMS/HCC)   • Angina at rest (CMS/HCC)   • Abnormal nuclear stress test     Past Medical History:   Diagnosis Date   • CAD (coronary artery disease)    • Colon cancer (CMS/HCC) 2016    Colon Cancer Tumors we found on 2016   • COPD (chronic obstructive pulmonary disease) (CMS/HCC)    • Hyperlipidemia    • Hypertension    • Myocardial infarction (CMS/HCC)      Past Surgical History:   Procedure Laterality Date   • BACK SURGERY     • CARDIAC CATHETERIZATION     • COLON SURGERY     • COLONOSCOPY     • CORONARY ANGIOPLASTY      Stent Placement   • ENDOSCOPY     • HYSTERECTOMY     • LUNG SURGERY     • NECK SURGERY     • OTHER SURGICAL HISTORY  2018    Rm 18 post op right L2-L3 microdiscectomy 2018     General Information     [facility] Name 20          PT Evaluation Time/Intention    Document Type  evaluation  -     Mode of Treatment  physical therapy  -     Row Name 20          General Information    Patient Profile Reviewed?  yes  -     Prior Level of Function  independent:  -     Existing Precautions/Restrictions  oxygen therapy device and L/min  -     Barriers to Rehab  none identified  -     Row Name 20          Relationship/Environment    Lives With  grandchild(roshni)  -     Row Name 20          Resource/Environmental Concerns    Current Living Arrangements  home/apartment/condo  -     Row Name 20 1753          Home Main Entrance    Number of Stairs, Main Entrance  one   -JH     Row Name 06/13/20 1753          Stairs Within Home, Primary    Number of Stairs, Within Home, Primary  none  -JH     Row Name 06/13/20 1753          Cognitive Assessment/Intervention- PT/OT    Orientation Status (Cognition)  oriented x 4  -JH     Row Name 06/13/20 1753          Safety Issues, Functional Mobility    Impairments Affecting Function (Mobility)  endurance/activity tolerance  -       User Key  (r) = Recorded By, (t) = Taken By, (c) = Cosigned By    Initials Name Provider Type     Zaira Moran PT Physical Therapist        Mobility     Row Name 06/13/20 1753          Bed Mobility Assessment/Treatment    Bed Mobility Assessment/Treatment  bed mobility (all) activities  -     [place] Level (Bed Mobility)  independent  -JH     Row Name 06/13/20 1753          Bed-Chair Transfer    Bed-Chair [place] (Transfers)  independent  -JH     Row Name 06/13/20 1753          Sit-Stand Transfer    Sit-Stand [place] (Transfers)  independent  -JH     Row Name 06/13/20 1753          Gait/Stairs Assessment/Training    Gait/Stairs Assessment/Training  gait/ambulation independence  -     [place] Level (Gait)  independent  -     Distance in Feet (Gait)  200'  -     Comment (Gait/Stairs)  normal gait pattern  -       User Key  (r) = Recorded By, (t) = Taken By, (c) = Cosigned By    Initials Name Provider Type     Zaira Moran PT Physical Therapist        Obj/Interventions     [facility] Name 06/13/20 1754          General ROM    GENERAL ROM COMMENTS  AROM WNLs  -JH     Row Name 06/13/20 1754          MMT (Manual Muscle Testing)    General MMT Comments  gross strength 5/5  -JH     Row Name 06/13/20 1754          Static Sitting Balance    Level of [place] (Unsupported Sitting, Static Balance)  independent  -JH     Row Name 06/13/20 1754          Dynamic Sitting Balance    Level of [place], Reaches Outside Midline (Sitting, Dynamic Balance)  independent  -JH     Row Name 06/13/20 1754           Static Standing Balance    Level of [place] (Supported Standing, Static Balance)  independent  -JH     Row Name 06/13/20 1754          Dynamic Standing Balance    Level of [place], Reaches Outside Midline (Standing, Dynamic Balance)  independent  -JH     Row Name 06/13/20 1754          Sensory Assessment/Intervention    Sensory General Assessment  no sensation deficits identified  -       User Key  (r) = Recorded By, (t) = Taken By, (c) = Cosigned By    Initials Name Provider Type     Zaira Moran, PT Physical Therapist        Goals/Plan    No documentation.       Clinical Impression     Row Name 06/13/20 1754          Pain Assessment    Additional Documentation  Pain Scale: Numbers Pre/Post-Treatment (Group)  -JH     Row Name 06/13/20 1754          Pain Scale: Numbers Pre/Post-Treatment    Pain Scale: Numbers, Pretreatment  0/10 - no pain  -     Pain Scale: Numbers, Post-Treatment  0/10 - no pain  -JH     Row Name 06/13/20 1754          Plan of Care Review    Plan of Care Reviewed With  patient  -     Outcome Summary  59 yo female smoker with COPD admitted with exacerbation.  Pt is not on home O2, 2L in hospital.  Pt is independent with all mobility, walks in [place] on 2L with sats 90-91%.  Walks in room on room air with sats 89%.  Pt safe for home at d/c, educated on option of OP pulmonary rehab progam.  PPE worn:  gloves and mask with face shield.  -JH     Row Name 06/13/20 1754          Physical Therapy Clinical Impression    Criteria for Skilled Interventions Met (PT Clinical Impression)  no;no problems identified which require skilled intervention  -JH     Row Name 06/13/20 1754          Vital Signs    O2 Delivery Pre Treatment  supplemental O2  -     O2 Delivery Intra Treatment  supplemental O2  -     O2 Delivery Post Treatment  supplemental O2  -JH     Row Name 06/13/20 1754          Positioning and Restraints    Pre-Treatment Position  in bed  -     Post Treatment Position   bed  -     In Bed  notified nsg;call light within reach  -       User Key  (r) = Recorded By, (t) = Taken By, (c) = Cosigned By    Initials Name Provider Type    Zaira Hudson PT Physical Therapist        Outcome Measures    No documentation.         PT Recommendation and Plan     Outcome Summary/Treatment Plan (PT)  Anticipated Discharge Disposition (PT): home(would benefit from OP pulmonary rehab program)  Plan of Care Reviewed With: patient  Outcome Summary: 59 yo female smoker with COPD admitted with exacerbation.  Pt is not on home O2, 2L in hospital.  Pt is independent with all mobility, walks in [place] on 2L with sats 90-91%.  Walks in room on room air with sats 89%.  Pt safe for home at d/c, educated on option of OP pulmonary rehab progam.  PPE worn:  gloves and mask with face shield.     Time Calculation:   PT Charges     Row Name 06/13/20 1756             Time Calculation    Start Time  1455  -      Stop Time  1520  -      Time Calculation (min)  25 min  -      PT Received On  06/13/20  -         Time Calculation- PT    Total Timed Code Minutes- PT  0 minute(s)  -        User Key  (r) = Recorded By, (t) = Taken By, (c) = Cosigned By    Initials Name Provider Type    Zaira Hudson PT Physical Therapist        Therapy Charges for Today     Code Description Service Date Service Provider Modifiers Qty    [id number] HC PT EVAL MOD COMPLEXITY 3 6/13/2020 Zaira Moran, YOLETTE GP 1               Zaira Moran PT  6/13/2020

## 2020-06-13 NOTE — DISCHARGE SUMMARY
"  Date of Admission: 6/9/2020    Date of Discharge:  6/13/2020    Length of stay:  LOS: 2 days     Discharge Diagnosis:     COPD exacerbation    Presenting Problem List:    Shortness of breath -may be multifactorial.  Patient's history of tobacco use and wheezing consistent with COPD exacerbation.  However patient also has cardiac history and noted chest pain with exertional dyspnea  -Troponins negative  -Mucolytic, addition of NAC  -Nebulizers  -Steroids  -Outpatient pulmonology referral follow-up PFTs and maintenance medication  -Stress test results showing significant area of ischemia  -Cardiology consulted  -Cardiac cath showing minimal coronary artery disease no stenting required  -Starting aspirin  -CT PE negative     COPD -in acute exacerbation  -5-day steroid course  -Mucolytic's  -Scheduled nebulizers  -Pulmonology referral sent for outpatient needs maintenance medication and repeat PFTs    Hyponatremia -possible ADH mediated stress response, improved from admission of 127, now normalized  -Monitor daily  -Patient appears euvolemic     Polycythemia -secondary most likely due to smoking history  -Monitor daily     Coronary artery disease -with history of MI has not seen cardiology in multiple years  -Troponins negative  -Daily baby aspirin started  -Continue statin consider increasing  -If blood pressure tolerates with PCP consider initiating low dose ACE inhibitor     Hypertension/depression/hormone replacement/IBS -chronic in nature  -Hold hormones  -Resume other medications clinically appropriate     HPI/Hospital Course:  60 year old female with PMH COPD and continued tobacco use , CAD s/p PCI presents with complaints of increased shortness of air past few days. She denies nausea, chest pain, dizziness, fever , cough or known Covid-19 exposure. Her oxygen sats were in 70-80s in ED which improved with IV steroids , duo neb treatments and oxygen via nasal canula, CXR per radiology:     \"IMPRESSION:  Stable " appearance of the chest. No active cardiopulmonary disease.  Chronic pleural thickening versus small chronic or recurrent pleural  effusions are noted.'     She is afebrile , Hgb 17.5, wbc 5.3, Na 127. EKG and troponin have been subsequently ordered and pending . She will be admitted for COPD exacerbation. PMH includes colon cancer, hyperlipidemia, depression, chronic hypertension, IBS. She denies known Covid-19 exposure .     6/10/2020: Patient reports feeling much better since initiation of nebulizers and steroids.  Breathing more comfortably and minimal coughing.  Patient reports that she has had some intermittent chest pain as well in the last week along with exertional dyspnea.  Patient has cardiologist but has not seen in multiple years.  When patient on room air after walking to bathroom she desatted into the 70s.     6/11/2020: Patient reports feeling the same as yesterday.  At rest no shortness of breath but continues to desat on room air.  Patient requiring 4 L of oxygen today, denies any further chest pain.  Awaiting results of stress test.  Trial of NAC    6/12/2020: Patient still requiring oxygen but decreased slightly.  Patient reports feeling better and breathing more comfortably.  Patient went for cardiac cath showing minimal vessel disease and requiring no stent placement.  Patient started on baby aspirin.    6/13/2020: Patient cleared by cardiology for discharge.  Patient evaluated by physical therapy and felt patient could go home but requiring 6-minute walk.  Patient set up with home oxygen.  Follow-up organized with primary care and cardiology and patient referral sent for pulmonology to manage COPD.  Patient to continue total of 5 days of 40 mg of prednisone started on baby aspirin and continue her beta-blocker.  Patient discharged home in good condition with strict return precautions given.    Procedures Performed    Procedure(s):  Left Heart Cath and coronary angiogram  Left  ventriculography  -------------------       Consults:   Consults     Date and Time Order Name Status Description    6/11/2020 1657 Inpatient Cardiology Consult Completed     6/9/2020 4556 Hospitalist (on-call MD unless specified) Completed           Pertinent Test Results:     Lab Results (last 24 hours)     ** No results found for the last 24 hours. **          Microbiology Results Abnormal     Procedure Component Value - Date/Time    COVID PRE-OP / PRE-PROCEDURE SCREENING ORDER (NO ISOLATION) - Swab, Nasopharynx [[id number]] Collected:  06/11/20 2158    Lab Status:  Final result Specimen:  Swab from Nasopharynx Updated:  06/12/20 0827    Narrative:       The following orders were created for panel order COVID PRE-OP / PRE-PROCEDURE SCREENING ORDER (NO ISOLATION) - Swab, Nasopharynx.  Procedure                               Abnormality         Status                     ---------                               -----------         ------                     COVID-19,CEPHEID,COR/JEMIMA...[[id number]]  Normal              Final result                 Please view results for these tests on the individual orders.    COVID-19,CEPHEID,COR/JEMIMA/PAD IN-HOUSE(OR EMERGENT/ADD-ON),NP SWAB IN TRANSPORT MEDIA 3-4 HR TAT - Swab, Nasopharynx [[id number]]  (Normal) Collected:  06/11/20 2158    Lab Status:  Final result Specimen:  Swab from Nasopharynx Updated:  06/12/20 0827     COVID19 Not Detected        No radiology results for the last day           Condition on Discharge:  Good    Vital Signs  Temp:  [98 °F (36.7 °C)-99 °F (37.2 °C)] 98 °F (36.7 °C)  Heart Rate:  [68-97] 74  Resp:  [12-18] 17  BP: ()/(57-88) 113/70    Physical Exam:  General: Slender elderly female lying in bed breathing comfortably on 2 L via nasal cannula no acute distress  HEENT: NC/AT, EOMI, mucosa moist  Heart: Noticed, rate controlled  Chest: Normal work of breathing, increased air movement, expiratory wheezing improved  Abdominal: Soft. NT/ND.    Musculoskeletal: Normal ROM.  No edema. No calf tenderness.  Neurological: AAOx3, no focal deficits  Skin: Skin is warm and dry. No rash  Psychiatric: Normal mood and affect.    Discharge Disposition  Home-Health Care [facility] [6]    Discharge Medications     Discharge Medications      New Medications      Instructions Start Date   aspirin 81 MG EC tablet   81 mg, Oral, Daily   Start Date:  June 14, 2020     predniSONE 20 MG tablet  Commonly known as:  DELTASONE   40 mg, Oral, Daily With Breakfast   Start Date:  June 14, 2020        Continue These Medications      Instructions Start Date   albuterol sulfate  (90 Base) MCG/ACT inhaler  Commonly known as:  PROVENTIL HFA;VENTOLIN HFA;PROAIR HFA   2 puffs, Inhalation, Every 4 Hours PRN      atorvastatin 20 MG tablet  Commonly known as:  LIPITOR   20 mg, Oral, Daily      dicyclomine 20 MG tablet  Commonly known as:  BENTYL   20 mg, Oral, Every 6 Hours      estradiol 1 MG tablet  Commonly known as:  ESTRACE   1 mg, Oral, Daily      ipratropium-albuterol 0.5-2.5 mg/3 ml nebulizer  Commonly known as:  DUO-NEB   3 mL, Nebulization, Every 4 Hours PRN      metoprolol tartrate 25 MG tablet  Commonly known as:  LOPRESSOR   25 mg, Oral, Daily      mirtazapine 30 MG tablet  Commonly known as:  REMERON   30 mg, Oral, Nightly      PARoxetine 20 MG tablet  Commonly known as:  PAXIL   20 mg, Oral, Every Morning         Stop These Medications    meloxicam 15 MG tablet  Commonly known as:  MOBIC            Discharge Diet:   Diet Instructions     Diet: Cardiac      Discharge Diet:  Cardiac          Activity at Discharge:   Activity Instructions     Activity as Tolerated            Follow-up Appointments  No future appointments.  Additional Instructions for the Follow-ups that You Need to Schedule     Discharge Follow-up with PCP   As directed       Currently Documented PCP:    Stanley French MD    PCP Phone Number:    [phone]     Follow Up Details:  Please follow-up  with your primary care doctor within a week to recheck your blood pressure and breathing         Discharge Follow-up with Specified Provider: These follow-up with your cardiologist in 2 to 4 weeks to recheck your heart   As directed      To:  These follow-up with your cardiologist in 2 to 4 weeks to recheck your heart               Test Results Pending at Discharge       Risk for Readmission (LACE) Score: 9 (6/13/2020  6:00 AM)        Peter Eduardo MD  06/13/20  16:14      Time: Discharge 25 min total time spent with face-to-face history/exam, writing all prescriptions, preparing medication reconciliation, and documenting discharge data including chart review of the full admission, care co-ordination with patient, family, , and , etc., and follow-up appointments with PCP and specialists as recommended.

## 2020-06-13 NOTE — PLAN OF CARE
Problem: Patient Care Overview  Goal: Plan of Care Review  Outcome: Ongoing (interventions implemented as appropriate)  Flowsheets (Taken 6/13/2020 0453)  Progress: no change  Plan of Care Reviewed With: patient  Outcome Summary: Patient on 3L O2.  Patient in bed eyes closed resting.  No c/o pain.  Continues on IV fluids.

## 2020-06-13 NOTE — DISCHARGE PLACEMENT REQUEST
"Kunal Wills (60 y.o. Female)     Date of Birth Social Security Number Address Home Phone MRN    1960  [id number] [place] [address] [phone] [id number]    [employer] Status          None        Admission Date Admission Type Admitting Provider Attending Provider Department, Room/Bed    6/9/20 Emergency Peter Eduardo MD Farley, Timothy Michael, MD [facility] 3C MEDICAL INPATIENT, 366/1    Discharge Date Discharge Disposition Discharge Destination         [facility]              Attending Provider:  Peter Eduardo MD    Allergies:  Latex    Isolation:  None   Infection:  None   Code Status:  CPR    Ht:  165.1 cm (65\")   Wt:  53.7 kg (118 lb 6.2 oz)    Admission Cmt:  None   Principal Problem:  None                Active Insurance as of 6/9/2020     Primary Coverage     Payor Plan Insurance Group Employer/Plan Group    HUMANA MEDICARE REPLACEMENT HUMANA MEDICARE REPLACEMENT [id number]     Payor Plan Address Payor Plan Phone Number Payor Plan Fax Number Effective Dates    [address] [address] [phone]  5/1/2019 - None Entered    [facility] [address]       Subscriber Name Subscriber Birth Date Member ID       KUNAL WILLS 1960 [id number]                 Emergency Contacts      (Rel.) Home Phone Work Phone Mobile Phone    BENJI WILLS (Daughter) -- -- [phone]              "

## 2020-06-14 ENCOUNTER — READMISSION MANAGEMENT (OUTPATIENT)
Dept: CALL CENTER | Facility: HOSPITAL | Age: 60
End: 2020-06-14

## 2020-06-14 NOTE — OUTREACH NOTE
Prep Survey      Responses   [facility]   Is LACE score < 7 ?  No   Eligibility  Readm Mgmt   Discharge diagnosis  Shortness of breath COPD -in acute exacerbation   COVID-19 Test Status  Negative   Does the patient have one of the following disease processes/diagnoses(primary or secondary)?  COPD/Pneumonia   Does the patient have Home health ordered?  No   Is there a DME ordered?  No   Prep survey completed?  Yes          Mechelle Iyer RN

## 2020-06-15 NOTE — PROGRESS NOTES
Discharge Planning Assessment   [facility]     Patient Name: Jocelin Wills  MRN: [id number]  Today's Date: 6/15/2020    Admit Date: 6/9/2020          Plan    Final Discharge Disposition Code  01 - home or self-care    Final Note  return home       Carol naegele rn  Case management  Office number [phone]  Cell phone [phone]

## 2020-06-16 ENCOUNTER — READMISSION MANAGEMENT (OUTPATIENT)
Dept: CALL CENTER | Facility: HOSPITAL | Age: 60
End: 2020-06-16

## 2020-06-16 NOTE — OUTREACH NOTE
COPD/PN Week 1 Survey      Responses   [facility] patient discharged from?  [facility]   COVID-19 Test Status  Negative   Does the patient have one of the following disease processes/diagnoses(primary or secondary)?  COPD/Pneumonia   Is there a successful TCM telephone encounter documented?  No   Was the primary reason for admission:  COPD exacerbation   Week 1 attempt successful?  No   Unsuccessful attempts  Attempt 1          Terri Kruger RN

## 2020-06-19 ENCOUNTER — READMISSION MANAGEMENT (OUTPATIENT)
Dept: CALL CENTER | Facility: HOSPITAL | Age: 60
End: 2020-06-19

## 2020-06-19 NOTE — OUTREACH NOTE
COPD/PN Week 1 Survey      Responses   [facility] patient discharged from?  [facility]   COVID-19 Test Status  Negative   Does the patient have one of the following disease processes/diagnoses(primary or secondary)?  COPD/Pneumonia   Is there a successful TCM telephone encounter documented?  No   Was the primary reason for admission:  COPD exacerbation   Week 1 attempt successful?  Yes   Call start time  1144   Call end time  1147   Discharge diagnosis  Shortness of breath COPD -in acute exacerbation   Meds reviewed with patient/caregiver?  Yes   Is the patient having any side effects they believe may be caused by any medication additions or changes?  No   Does the patient have all medications ordered at discharge?  Yes   Is the patient taking all medications as directed (includes completed medication regime)?  Yes   Comments regarding appointments  PCP sent referral for pum appt. Pt is waiting on a call from Pulm office for appt.   Does the patient have a primary care provider?   Yes   Does the patient have an appointment with their PCP or pulmonologist within 7 days of discharge?  Yes   Comments regarding PCP  6/15/20   Has the patient kept scheduled appointments due by today?  Yes   Pulse Ox monitoring  Intermittent   Pulse Ox device source  Patient   O2 Sat comments  O2 sat have been in the 90's with 3 LPM O2 continuous    Psychosocial issues?  No   Did the patient receive a copy of their discharge instructions?  Yes   Nursing interventions  Reviewed instructions with patient   What is the patient's perception of their health status since discharge?  Improving   Nursing Interventions  Nurse provided patient education   Are the patient's immunizations up to date?   Yes   If the patient is a current smoker, are they able to teach back resources for cessation?  -- [Nonsmoker]   Is the patient/caregiver able to teach back the hierarchy of who to call/visit for symptoms/problems? PCP, Specialist, Home health nurse,  Urgent Care, ED, 911  Yes   Is the patient able to teach back COPD zones?  Yes   Nursing interventions  Education provided on various zones   Patient reports what zone on this call?  Green Zone   Green Zone  Reports doing well, Breathing without shortness of breath, Usual amount of phlegm/mucus without difficulty coughing up   Green Zone interventions:  Take daily medications, Do not smoke, Use oxygen as prescribed, Avoid indoor/outdoor triggers   Week 1 call completed?  Yes          Niya Jensen RN

## 2020-06-25 ENCOUNTER — READMISSION MANAGEMENT (OUTPATIENT)
Dept: CALL CENTER | Facility: HOSPITAL | Age: 60
End: 2020-06-25

## 2020-06-25 NOTE — OUTREACH NOTE
COPD/PN Week 2 Survey      Responses   [facility] patient discharged from?  [facility]   COVID-19 Test Status  Negative   Does the patient have one of the following disease processes/diagnoses(primary or secondary)?  COPD/Pneumonia   Was the primary reason for admission:  COPD exacerbation   Week 2 attempt successful?  No   Unsuccessful attempts  Attempt 1          Apoorva Tyson RN

## 2020-06-30 ENCOUNTER — READMISSION MANAGEMENT (OUTPATIENT)
Dept: CALL CENTER | Facility: HOSPITAL | Age: 60
End: 2020-06-30

## 2020-06-30 NOTE — OUTREACH NOTE
COPD/PN Week 2 Survey      Responses   [facility] patient discharged from?  [facility]   COVID-19 Test Status  Negative   Does the patient have one of the following disease processes/diagnoses(primary or secondary)?  COPD/Pneumonia   Was the primary reason for admission:  COPD exacerbation   Week 2 attempt successful?  Yes   Call start time  1538   Call end time  1541   Discharge diagnosis  Shortness of breath COPD -in acute exacerbation   Meds reviewed with patient/caregiver?  Yes   Is the patient taking all medications as directed (includes completed medication regime)?  Yes   Does the patient have a primary care provider?   Yes   Does the patient have an appointment with their PCP or pulmonologist within 7 days of discharge?  Yes   Comments regarding PCP  Patient reports she has f/u with PCP.  She also has an appt with pulmonologist on 07/15/20   Has the patient kept scheduled appointments due by today?  Yes   DME comments  3L continuous   Pulse Ox monitoring  Intermittent   Pulse Ox device source  Patient   O2 Sat comments  saturations are above    O2 Sat: education provided  Sat levels   Psychosocial issues?  No   Did the patient receive a copy of their discharge instructions?  Yes   Nursing interventions  Reviewed instructions with patient   What is the patient's perception of their health status since discharge?  Improving   Is the patient/caregiver able to teach back the hierarchy of who to call/visit for symptoms/problems? PCP, Specialist, Home health nurse, Urgent Care, ED, 911  Yes   Is the patient able to teach back COPD zones?  Yes   Patient reports what zone on this call?  Green Zone   Green Zone  Reports doing well, Breathing without shortness of breath   Green Zone interventions:  Take daily medications, Use oxygen as prescribed   Week 2 call completed?  Yes   Wrap up additional comments  Patient reports she is doing well at this time.  She denies any needs currently.            Pura Charles RN

## 2020-07-07 ENCOUNTER — READMISSION MANAGEMENT (OUTPATIENT)
Dept: CALL CENTER | Facility: HOSPITAL | Age: 60
End: 2020-07-07

## 2020-07-07 NOTE — OUTREACH NOTE
COPD/PN Week 3 Survey      Responses   [facility] patient discharged from?  [facility]   COVID-19 Test Status  Negative   Does the patient have one of the following disease processes/diagnoses(primary or secondary)?  COPD/Pneumonia   Was the primary reason for admission:  COPD exacerbation   Week 3 attempt successful?  No   Unsuccessful attempts  Attempt 1          Akosua Santo RN

## 2020-07-09 ENCOUNTER — READMISSION MANAGEMENT (OUTPATIENT)
Dept: CALL CENTER | Facility: HOSPITAL | Age: 60
End: 2020-07-09

## 2020-07-09 NOTE — OUTREACH NOTE
COPD/PN Week 3 Survey      Responses   [facility] patient discharged from?  [facility]   COVID-19 Test Status  Negative   Does the patient have one of the following disease processes/diagnoses(primary or secondary)?  COPD/Pneumonia   Was the primary reason for admission:  COPD exacerbation   Week 3 attempt successful?  Yes   Call start time  1540   Call end time  1546   Discharge diagnosis  Shortness of breath COPD -in acute exacerbation   Is patient permission given to speak with other caregiver?  No   Meds reviewed with patient/caregiver?  Yes   Is the patient having any side effects they believe may be caused by any medication additions or changes?  No   Does the patient have all medications ordered at discharge?  Yes   Is the patient taking all medications as directed (includes completed medication regime)?  Yes   Does the patient have a primary care provider?   Yes   Comments regarding PCP  patient has seen PCP since discharge.    Has the patient kept scheduled appointments due by today?  Yes   Comments  Patient reports has pulmonology appt coming up next week.    Has home health visited the patient within 72 hours of discharge?  N/A   DME comments  3L continuous   Pulse Ox monitoring  Intermittent   Pulse Ox device source  Patient   O2 Sat comments  Patient states sats usually about 92% with O2 on. She states lowest in the 80's when she first wakes up.    O2 Sat: education provided  Sat levels, Monitoring frequency, When to seek care   Psychosocial issues?  No   Did the patient receive a copy of their discharge instructions?  Yes   Nursing interventions  Reviewed instructions with patient   What is the patient's perception of their health status since discharge?  Same   Nursing Interventions  Nurse provided patient education   Is the patient/caregiver able to teach back the hierarchy of who to call/visit for symptoms/problems? PCP, Specialist, Home health nurse, Urgent Care, ED, 911  Yes   Patient reports  what zone on this call?  Green Zone   Green Zone  Usual activity and exercise level   Green Zone interventions:  Take daily medications, Use oxygen as prescribed, Avoid indoor/outdoor triggers   Week 3 call completed?  Yes          Yashira Martinez RN

## 2020-07-14 ENCOUNTER — OFFICE VISIT (OUTPATIENT)
Dept: PULMONOLOGY | Facility: HOSPITAL | Age: 60
End: 2020-07-14

## 2020-07-14 VITALS
TEMPERATURE: 99 F | HEIGHT: 65 IN | SYSTOLIC BLOOD PRESSURE: 105 MMHG | WEIGHT: 111 LBS | RESPIRATION RATE: 18 BRPM | BODY MASS INDEX: 18.49 KG/M2 | OXYGEN SATURATION: 97 % | HEART RATE: 76 BPM | DIASTOLIC BLOOD PRESSURE: 68 MMHG

## 2020-07-14 DIAGNOSIS — J96.12 CHRONIC RESPIRATORY FAILURE WITH HYPOXIA AND HYPERCAPNIA (HCC): ICD-10-CM

## 2020-07-14 DIAGNOSIS — R06.00 DYSPNEA, UNSPECIFIED TYPE: Primary | ICD-10-CM

## 2020-07-14 DIAGNOSIS — J44.9 CHRONIC OBSTRUCTIVE PULMONARY DISEASE, UNSPECIFIED COPD TYPE (HCC): ICD-10-CM

## 2020-07-14 DIAGNOSIS — J96.11 CHRONIC RESPIRATORY FAILURE WITH HYPOXIA AND HYPERCAPNIA (HCC): ICD-10-CM

## 2020-07-14 PROBLEM — Z72.0 TOBACCO ABUSE: Status: ACTIVE | Noted: 2020-07-14

## 2020-07-14 PROBLEM — I25.10 CORONARY ARTERY DISEASE: Status: ACTIVE | Noted: 2020-07-14

## 2020-07-14 PROBLEM — I21.3 ST ELEVATION (STEMI) MYOCARDIAL INFARCTION: Status: ACTIVE | Noted: 2020-07-14

## 2020-07-14 PROBLEM — J96.21 ACUTE ON CHRONIC RESPIRATORY FAILURE WITH HYPOXIA: Status: RESOLVED | Noted: 2020-06-09 | Resolved: 2020-07-14

## 2020-07-14 PROBLEM — I10 HYPERTENSION: Status: ACTIVE | Noted: 2020-07-14

## 2020-07-14 PROBLEM — E78.5 HYPERLIPIDEMIA: Status: ACTIVE | Noted: 2020-07-14

## 2020-07-14 PROCEDURE — G0463 HOSPITAL OUTPT CLINIC VISIT: HCPCS

## 2020-07-14 RX ORDER — HYDROCODONE BITARTRATE AND ACETAMINOPHEN 10; 325 MG/1; MG/1
1 TABLET ORAL EVERY 8 HOURS PRN
COMMUNITY
Start: 2020-06-15

## 2020-07-14 RX ORDER — ALPRAZOLAM 1 MG/1
1 TABLET ORAL DAILY PRN
COMMUNITY
Start: 2020-06-15

## 2020-07-14 NOTE — PROGRESS NOTES
7/14/2020     Joclein Wills  1960      Chief Complaint   Patient presents with   • COPD     New Pt (Ref by Dr. French)         Subjective   61 y/o female with hx of COPD, chronic hypoxic resp failure on home O2 3 liters, tobacco abuse since age 15, CAD, c/o worsening   MAYES with minimal activities, unable to participate with cardiac rehab due to bradycardia and near syncope, followed by Cardiology.       Review of System  General: Denies fevers or chills.  Denies any weakness or fatigue.  Denies any weight loss or weight gain.  HEENT: Denies sore throat, rhinorrhea, ear pain.  Denies any change in vision.   LUNGS: Worsening MAYES, chronic cough, occasional wheezing   CARDIAC: Denies any chest pain, palpitations. Denies edema  ABD: Denies any abdominal pain.  Denies any N/V/D  PSYCH: Negative for suicidal ideas. Denies anxiety or depression  All other systems reviewed and negative unless stated in HPI       Objective    Vitals:    07/14/20 1309   BP: 105/68   Pulse: 76   Resp: 18   Temp: 99 °F (37.2 °C)   SpO2: 97%       General: NAD, alert and oriented x 4  Cardiac: S1, S2, RRR, no murmur, no edema  Pulmonary: poor effort, mild end exp wheezing at bases   Abdomen: soft, non-tender, non-distended  : Voids independently, no CVA tenderness   Musculoskeletal: Moves all extremities, equal strength bilaterally  Neuro: alert and oriented x 3, CN 2 - 12 grossly intact  Skin: warm, dry, and intact          Current Outpatient Medications:   •  albuterol sulfate  (90 Base) MCG/ACT inhaler, Inhale 2 puffs Every 4 (Four) Hours As Needed for Wheezing., Disp: , Rfl:   •  ALPRAZolam (XANAX) 1 MG tablet, Take 1 tablet by mouth Daily As Needed., Disp: , Rfl:   •  aspirin 81 MG EC tablet, Take 1 tablet by mouth Daily for 30 days., Disp: 30 tablet, Rfl: 0  •  atorvastatin (LIPITOR) 20 MG tablet, Take 20 mg by mouth Daily., Disp: , Rfl:   •  dicyclomine (BENTYL) 20 MG tablet, Take 20 mg by mouth Every 6 (Six) Hours., Disp:  , Rfl:   •  estradiol (ESTRACE) 1 MG tablet, Take 1 mg by mouth Daily., Disp: , Rfl:   •  HYDROcodone-acetaminophen (NORCO)  MG per tablet, Take 1 tablet by mouth Every 8 (Eight) Hours As Needed., Disp: , Rfl:   •  INCRUSE ELLIPTA 62.5 MCG/INH aerosol powder , Inhale 1 puff Daily., Disp: , Rfl:   •  ipratropium-albuterol (DUO-NEB) 0.5-2.5 mg/3 ml nebulizer, Take 3 mL by nebulization Every 4 (Four) Hours As Needed for Wheezing., Disp: , Rfl:   •  metoprolol tartrate (LOPRESSOR) 25 MG tablet, Take 25 mg by mouth Daily., Disp: , Rfl:   •  mirtazapine (REMERON) 30 MG tablet, Take 30 mg by mouth Every Night., Disp: , Rfl:   •  PARoxetine (PAXIL) 20 MG tablet, Take 20 mg by mouth Every Morning., Disp: , Rfl:     Social History     Socioeconomic History   • Marital status:      Spouse name: Not on file   • Number of children: Not on file   • Years of education: Not on file   • Highest education level: Not on file   Tobacco Use   • Smoking status: Current Every Day Smoker     Packs/day: 0.50     Types: Cigarettes   • Smokeless tobacco: Never Used   Substance and Sexual Activity   • Alcohol use: Never     Frequency: Never   • Drug use: Never   • Sexual activity: Defer       Past Medical History:   Diagnosis Date   • CAD (coronary artery disease)    • Colon cancer (CMS/HCC) 04/13/2016    Colon Cancer Tumors we found on 04/13/2016   • COPD (chronic obstructive pulmonary disease) (CMS/HCC)    • Hyperlipidemia    • Hypertension    • Myocardial infarction (CMS/[facility])                Diagnoses and all orders for this visit:      Chronic obstructive pulmonary disease, unspecified COPD type (CMS/HCC)  - Not well controlled with Incruse and Albuterol prn. Will add LABA/inhaled steroid.      Chronic respiratory failure with hypoxia and hypercapnia (CMS/HCC)  - ABG 6/9/20 Ph 7.42, PCO2 55, PO2 60, HCO3 36   - Patient requires nocturnal and daytime volume ventilation as needed. Home Bipap insufficient due to severity of  condition. COPD is primary cause of chronic hypercapnic respiratory failure, ventilator required. \    Tobacco abuse- Smoker 1PPD since age 15. Down to 1/2 ppd. Smoking cessation discussed.         Aidan Carr MD

## 2020-07-15 ENCOUNTER — TELEPHONE (OUTPATIENT)
Dept: PULMONOLOGY | Facility: HOSPITAL | Age: 60
End: 2020-07-15

## 2020-07-15 NOTE — TELEPHONE ENCOUNTER
Per [facility], pt can not afford the copay and high deductible for the Trilogy machine. Please advise

## 2020-07-16 ENCOUNTER — TELEPHONE (OUTPATIENT)
Dept: PULMONOLOGY | Facility: HOSPITAL | Age: 60
End: 2020-07-16

## 2020-07-16 NOTE — TELEPHONE ENCOUNTER
Per Maggie at [facility], they spoke with the patient and she really wants the machine so they are going to work with her on the financial part and do a hardship with her.

## 2020-07-17 ENCOUNTER — READMISSION MANAGEMENT (OUTPATIENT)
Dept: CALL CENTER | Facility: HOSPITAL | Age: 60
End: 2020-07-17

## 2020-07-17 NOTE — OUTREACH NOTE
COPD/PN Week 4 Survey      Responses   [facility] patient discharged from?  [facility]   COVID-19 Test Status  Negative   Does the patient have one of the following disease processes/diagnoses(primary or secondary)?  COPD/Pneumonia   Was the primary reason for admission:  COPD exacerbation   Week 4 attempt successful?  Yes   Call start time  1144   Call end time  1147   Discharge diagnosis  Shortness of breath COPD -in acute exacerbation   Meds reviewed with patient/caregiver?  Yes   Is the patient taking all medications as directed (includes completed medication regime)?  Yes   Has the patient kept scheduled appointments due by today?  Yes   Psychosocial issues?  No   What is the patient's perception of their health status since discharge?  Returned to baseline/stable   Nursing Interventions  Nurse provided patient education   If the patient is a current smoker, are they able to teach back resources for cessation?  [id number], Smoking cessation medications   Is the patient/caregiver able to teach back the hierarchy of who to call/visit for symptoms/problems? PCP, Specialist, Home health nurse, Urgent Care, ED, 911  Yes   Additional teach back comments  Pt doing better and has Trilogy machine now. Smokes a few cigarettes per day--enc. cessation   Is the patient able to teach back COPD zones?  Yes   Nursing interventions  Education provided on various zones   Patient reports what zone on this call?  Green Zone   Green Zone  Reports doing well, Breathing without shortness of breath, Usual activity and exercise level   Green Zone interventions:  Take daily medications, Avoid indoor/outdoor triggers   Week 4 call completed?  Yes   Would the patient like one additional call?  No   Graduated  Yes   Did the patient feel the follow up calls were helpful during their recovery period?  Yes   Was the number of calls appropriate?  Yes          Rosemary Núñez RN

## 2020-11-18 ENCOUNTER — OFFICE VISIT (OUTPATIENT)
Dept: PULMONOLOGY | Facility: HOSPITAL | Age: 60
End: 2020-11-18

## 2020-11-18 VITALS
SYSTOLIC BLOOD PRESSURE: 135 MMHG | HEIGHT: 65 IN | HEART RATE: 68 BPM | DIASTOLIC BLOOD PRESSURE: 82 MMHG | WEIGHT: 124 LBS | OXYGEN SATURATION: 94 % | BODY MASS INDEX: 20.66 KG/M2 | TEMPERATURE: 98.3 F | RESPIRATION RATE: 18 BRPM

## 2020-11-18 DIAGNOSIS — J44.9 CHRONIC OBSTRUCTIVE PULMONARY DISEASE, UNSPECIFIED COPD TYPE (HCC): Primary | ICD-10-CM

## 2020-11-18 PROCEDURE — G0463 HOSPITAL OUTPT CLINIC VISIT: HCPCS

## 2020-11-18 NOTE — PROGRESS NOTES
11/18/2020     Jocelin Wills  1960      Chief Complaint   Patient presents with   • COPD     4 Month F/U         Subjective   59 y/o female with hx of COPD, chronic hypoxic resp failure on home O2 3 liters, tobacco abuse since age 15, CAD.  Last visit was 4 months ago.  Patient feels that her breathing is some better. Occasional cough worse in the am. +SOA with activity. Occasional wheezing. Worse with the season change and activity.  Patient is using Duonebs, Breo, Incruse and O2 3L continuous.  Has started using Trilogy with a FF mask. Typical bedtime is around 930 pm and wakes up around 9 am. C/O some mask discomfort and some swelling around her eyes. Feels that it has helped her symptoms of SOA and energy levels.  Continues to smoke and is now smoking about 1/2 PPD  No Hospitalizations or ER visits since last visit  No steroids and abx use since last visity.       Review of System  General: Denies fevers or chills.  Denies any weakness or fatigue.  Denies any weight loss or weight gain.  HEENT: Denies sore throat, rhinorrhea, ear pain.  Denies any change in vision.   LUNGS: + MAYES, chronic cough, occasional wheezing   CARDIAC: Denies any chest pain, palpitations. Denies edema  ABD: Denies any abdominal pain.  Denies any N/V/D  PSYCH: Negative for suicidal ideas. Denies anxiety or depression  All other systems reviewed and negative unless stated in HPI       Objective    Vitals:    11/18/20 1415   BP: 135/82   Pulse: 68   Resp: 18   Temp: 98.3 °F (36.8 °C)   SpO2: 94%       General Appearance: Awake, no distress, appears stated age  Head:  Normocephalic, without obvious abnormality, atraumatic  Eyes: conjunctiva/corneas clear , EOMI  Neck:  Supple,  no adenopathy, no JVD or bruit      Lungs: Decreased air entry bilaterally, no crackles or wheezes  Chest wall:  No tenderness  Heart:  Regular rate and rhythm, S1 and S2 normal, no murmur, rub or gallop  Abdomen:  Soft, non-tender, bowel sounds active all four  quadrants,  no masses, no hepatomegaly, no splenomegaly  Extremities:  Extremities normal, no cyanosis or edema  Pulses: 2+ and symmetric all extremities  Skin:  No rashes or lesions  Neurologic: Awake, 5 x 5 power in all extremities.  Cranial nerves II through XII grossly intact.           Current Outpatient Medications:   •  albuterol sulfate  (90 Base) MCG/ACT inhaler, Inhale 2 puffs Every 4 (Four) Hours As Needed for Wheezing., Disp: , Rfl:   •  ALPRAZolam (XANAX) 1 MG tablet, Take 1 tablet by mouth Daily As Needed., Disp: , Rfl:   •  atorvastatin (LIPITOR) 20 MG tablet, Take 20 mg by mouth Daily., Disp: , Rfl:   •  dicyclomine (BENTYL) 20 MG tablet, Take 20 mg by mouth Every 6 (Six) Hours., Disp: , Rfl:   •  estradiol (ESTRACE) 1 MG tablet, Take 1 mg by mouth Daily., Disp: , Rfl:   •  Fluticasone Furoate-Vilanterol (Breo Ellipta) 200-25 MCG/INH inhaler, Inhale 1 puff Daily., Disp: 1 inhaler, Rfl: 12  •  HYDROcodone-acetaminophen (NORCO)  MG per tablet, Take 1 tablet by mouth Every 8 (Eight) Hours As Needed., Disp: , Rfl:   •  INCRUSE ELLIPTA 62.5 MCG/INH aerosol powder , Inhale 1 puff Daily., Disp: , Rfl:   •  ipratropium-albuterol (DUO-NEB) 0.5-2.5 mg/3 ml nebulizer, Take 3 mL by nebulization Every 4 (Four) Hours As Needed for Wheezing., Disp: , Rfl:   •  metoprolol tartrate (LOPRESSOR) 25 MG tablet, Take 25 mg by mouth Daily., Disp: , Rfl:   •  mirtazapine (REMERON) 30 MG tablet, Take 30 mg by mouth Every Night., Disp: , Rfl:   •  PARoxetine (PAXIL) 20 MG tablet, Take 20 mg by mouth Every Morning., Disp: , Rfl:     Social History     Socioeconomic History   • Marital status:      Spouse name: Not on file   • Number of children: Not on file   • Years of education: Not on file   • Highest education level: Not on file   Tobacco Use   • Smoking status: Current Every Day Smoker     Packs/day: 0.50     Types: Cigarettes   • Smokeless tobacco: Never Used   Substance and Sexual Activity   •  Alcohol use: Never     Frequency: Never   • Drug use: Never   • Sexual activity: Defer       Past Medical History:   Diagnosis Date   • CAD (coronary artery disease)    • Colon cancer (CMS/HCC) 04/13/2016    Colon Cancer Tumors we found on 04/13/2016   • COPD (chronic obstructive pulmonary disease) (CMS/HCC)    • Hyperlipidemia    • Hypertension    • Myocardial infarction (CMS/HCC)                Diagnoses and all orders for this visit:      Chronic obstructive pulmonary disease, unspecified COPD type (CMS/HCC)  - Improving with Trilogy noninvasive vent at night  - CT Chest 6/20: No pulmonary embolus. Mild emphysema, unchanged. There is also mild bronchial wall thickening diffusely compatible with chronic or acute bronchitis. It is  a change since 2016. Stable postsurgical changes from partial right lower lobectomy.  -Remains on Nebs, Breo, Incruse. Can potentially change to albuterol nebs as patient is on Incruse.  -Set up PFts  -May need Pulm Rehab depending on hier PFts results     Chronic respiratory failure with hypoxia and hypercapnia (CMS/HCC)  - ABG 6/9/20 Ph 7.42, PCO2 55, PO2 60, HCO3 36   - Remains on Trilogy Noninvasive vent and supplemental O2  -Patient reports good compliance and good symptomatic control. Benefitting from use. Await smart card download from [employer]    Tobacco abuse- Smoker 1PPD since age 15. Down to 1/2 ppd. Risk of continued tobacco abuse discussed with patient including risk of morbidity and mortality and increased risk of cancers.  Smoking cessation reinforced and recommended    HTN  -Normotensive today.  Continue current therapy    F/U 3 months    Lowell Flannery MD

## 2020-12-05 ENCOUNTER — LAB (OUTPATIENT)
Dept: LAB | Facility: HOSPITAL | Age: 60
End: 2020-12-05

## 2020-12-05 DIAGNOSIS — J44.9 CHRONIC OBSTRUCTIVE PULMONARY DISEASE, UNSPECIFIED COPD TYPE (HCC): ICD-10-CM

## 2020-12-05 PROCEDURE — U0004 COV-19 TEST NON-CDC HGH THRU: HCPCS

## 2020-12-05 PROCEDURE — C9803 HOPD COVID-19 SPEC COLLECT: HCPCS

## 2020-12-06 LAB — SARS-COV-2 RNA RESP QL NAA+PROBE: NOT DETECTED

## 2020-12-08 ENCOUNTER — HOSPITAL ENCOUNTER (OUTPATIENT)
Dept: RESPIRATORY THERAPY | Facility: HOSPITAL | Age: 60
Discharge: HOME | End: 2020-12-08
Admitting: INTERNAL MEDICINE

## 2020-12-08 PROCEDURE — 94640 AIRWAY INHALATION TREATMENT: CPT

## 2020-12-08 PROCEDURE — A9270 NON-COVERED ITEM OR SERVICE: HCPCS | Performed by: INTERNAL MEDICINE

## 2020-12-08 PROCEDURE — 94060 EVALUATION OF WHEEZING: CPT

## 2020-12-08 PROCEDURE — 94727 GAS DIL/WSHOT DETER LNG VOL: CPT

## 2020-12-08 PROCEDURE — 63710000001 ALBUTEROL SULFATE HFA 108 (90 BASE) MCG/ACT AEROSOL SOLUTION 6.7 G INHALER: Performed by: INTERNAL MEDICINE

## 2020-12-08 PROCEDURE — 94729 DIFFUSING CAPACITY: CPT

## 2020-12-08 RX ORDER — ALBUTEROL SULFATE 90 UG/1
2 AEROSOL, METERED RESPIRATORY (INHALATION) ONCE AS NEEDED
Status: COMPLETED | OUTPATIENT
Start: 2020-12-08 | End: 2020-12-08

## 2020-12-08 RX ADMIN — ALBUTEROL SULFATE 2 PUFF: 90 AEROSOL, METERED RESPIRATORY (INHALATION) at 15:57

## 2021-03-15 ENCOUNTER — OFFICE VISIT (OUTPATIENT)
Dept: PULMONOLOGY | Facility: HOSPITAL | Age: 61
End: 2021-03-15

## 2021-03-15 VITALS
SYSTOLIC BLOOD PRESSURE: 129 MMHG | OXYGEN SATURATION: 98 % | RESPIRATION RATE: 16 BRPM | BODY MASS INDEX: 20.83 KG/M2 | WEIGHT: 125 LBS | HEIGHT: 65 IN | TEMPERATURE: 95.7 F | HEART RATE: 58 BPM | DIASTOLIC BLOOD PRESSURE: 77 MMHG

## 2021-03-15 DIAGNOSIS — J44.9 CHRONIC OBSTRUCTIVE PULMONARY DISEASE, UNSPECIFIED COPD TYPE (HCC): ICD-10-CM

## 2021-03-15 DIAGNOSIS — Z72.0 TOBACCO ABUSE: ICD-10-CM

## 2021-03-15 DIAGNOSIS — J96.12 CHRONIC RESPIRATORY FAILURE WITH HYPOXIA AND HYPERCAPNIA (HCC): Primary | ICD-10-CM

## 2021-03-15 DIAGNOSIS — J96.11 CHRONIC RESPIRATORY FAILURE WITH HYPOXIA AND HYPERCAPNIA (HCC): Primary | ICD-10-CM

## 2021-03-15 PROCEDURE — G0463 HOSPITAL OUTPT CLINIC VISIT: HCPCS

## 2021-03-15 RX ORDER — BUPROPION HYDROCHLORIDE 100 MG/1
100 TABLET, EXTENDED RELEASE ORAL 2 TIMES DAILY
Qty: 60 TABLET | Refills: 11 | Status: SHIPPED | OUTPATIENT
Start: 2021-03-15 | End: 2021-12-21 | Stop reason: HOSPADM

## 2021-03-15 RX ORDER — BUDESONIDE 0.5 MG/2ML
0.5 INHALANT ORAL
Qty: 60 EACH | Refills: 11 | Status: SHIPPED | OUTPATIENT
Start: 2021-03-15 | End: 2023-01-30 | Stop reason: SDUPTHER

## 2021-03-15 RX ORDER — ARFORMOTEROL TARTRATE 15 UG/2ML
15 SOLUTION RESPIRATORY (INHALATION)
Qty: 120 ML | Refills: 11 | Status: SHIPPED | OUTPATIENT
Start: 2021-03-15 | End: 2021-06-21 | Stop reason: HOSPADM

## 2021-03-15 NOTE — PROGRESS NOTES
PULMONARY  CONSULT NOTE      PATIENT IDENTIFICATION:  Name: Jocelin Wills  Age: 60 y.o.  Sex: female  :  1960  MRN: [id number]    DATE OF CONSULTATION:  3/15/2021                     CHIEF COMPLAINT: Chronic obstructive airway disease    History of Present Illness:   Jocelin Wills is a 60 y.o. female  Pt with few months history of shortness of breath with activity, with cough with white sputum, no hemotysis, no fever no chills no chest pain no gastroesophageal reflex.          Review of Systems:   Constitutional:  As above   Eyes: negative   ENT/oropharynx: negative   Cardiovascular: negative   Respiratory:  As above   Gastrointestinal: negative   Genitourinary: negative   Neurological: negative   Musculoskeletal: negative   Integument/breast: negative   Endocrine: negative   Allergic/Immunologic: negative     Past Medical History:  Past Medical History:   Diagnosis Date   • CAD (coronary artery disease)    • Colon cancer (CMS/HCC) 2016    Colon Cancer Tumors we found on 2016   • COPD (chronic obstructive pulmonary disease) (CMS/HCC)    • Hyperlipidemia    • Hypertension    • Myocardial infarction (CMS/HCC)        Past Surgical History:  Past Surgical History:   Procedure Laterality Date   • BACK SURGERY     • CARDIAC CATHETERIZATION     • CARDIAC CATHETERIZATION Left 2020    Procedure: Left Heart Cath and coronary angiogram;  Surgeon: Jose Paredes MD;  Location: [facility] CATH INVASIVE LOCATION;  Service: Cardiovascular;  Laterality: Left;   • CARDIAC CATHETERIZATION N/A 2020    Procedure: Left ventriculography;  Surgeon: Jose Paredes MD;  Location: [facility] CATH INVASIVE LOCATION;  Service: Cardiovascular;  Laterality: N/A;   • COLON SURGERY     • COLONOSCOPY     • CORONARY ANGIOPLASTY      Stent Placement   • ENDOSCOPY     • HYSTERECTOMY     • LUNG SURGERY     • NECK SURGERY     • OTHER SURGICAL HISTORY  2018    Rm 18 post op right L2-L3 microdiscectomy 2018         Family History:  Family History   Problem Relation Age of Onset   • Lung disease Mother         Lung/Respiratory Disease   • Diabetes Father    • Heart disease Father    • Heart disease Sister    • Diabetes Sister         Social History:   Social History     Tobacco Use   • Smoking status: Current Every Day Smoker     Packs/day: 0.50     Types: Cigarettes   • Smokeless tobacco: Never Used   Substance Use Topics   • Alcohol use: Never        Allergies:  Allergies   Allergen Reactions   • Latex Rash       Home Meds:  (Not in a hospital admission)      Objective:    Vitals Ranges:   Temp:  [95.7 °F (35.4 °C)] 95.7 °F (35.4 °C)  Heart Rate:  [58] 58  Resp:  [16] 16  BP: (129)/(77) 129/77  Body mass index is 20.8 kg/m².     Exam:  General Appearance:  WDWN    HEENT:   without obvious abnormality,  Conjunctiva/corneas clear,  Normal external ear canals, no drainage    Clear orsalmucosa,  Mallampati score 3    Neck:  Supple, symmetrical, trachea midline. No JVD.  Lungs:   Bilateral basal rhonchi bilaterally, respirations unlabored symmetrical wall movement.    Chest wall:  No tenderness or deformity.    Heart:  Regular rate and rhythm, S1 and S2 normal.  Extremities: Trace edema no clubbing or Cyanosis        Data Review:  All labs (24hrs): No results found for this or any previous visit (from the past 24 hour(s)).     Imaging:  Pulmonary Function Test   Pulmonary function test 12/08/2020     Spirometry  FEV1 0.54 L which is 23% predicted there was minimal response to   bronchodilator with increase in FEV1 to  27%    FEV1/FVC ratio 49% predicted    Lung volumes  Total lung capacity 94% predicted  Residual volume 150% predicted    Diffusion capacity 43% predicted.      Flow volume loop suggestive of obstruction.      Impression  Above finding suggestive of very severe or end-stage COPD with minimum   response bronchodilators there is evidence of air trapping       ASSESSMENT:  Diagnoses and all orders for this  visit:    Chronic respiratory failure with hypoxia and hypercapnia (CMS/HCC)    Chronic obstructive pulmonary disease, unspecified COPD type (CMS/HCC)    Tobacco abuse    Other orders  -     buPROPion SR (Wellbutrin SR) 100 MG 12 hr tablet; Take 1 tablet by mouth 2 (Two) Times a Day.  -     arformoterol (Brovana) 15 MCG/2ML nebulizer solution; Take 2 mL by nebulization 2 (Two) Times a Day.  -     budesonide (Pulmicort) 0.5 MG/2ML nebulizer solution; Take 2 mL by nebulization Daily. DX J44.9        PLAN:   Bronchodilator inhaled corticosteroid changing to Pulmicort and Brovana    Education how to use inhalers    Encouraged to use incentive spirometer    Continue to exercise slowly as tolerated    Monitor for any change in the color of the sputum    Avoid any exposure to fumes, gas or any irritant    Education patient  About tips for  smoking cessation and risk factors and benefit, was to a discussion with the patient.  Adding Wellbutrin and nicotine patch    Follow-up as above    Pacheco Crockett MD. D, ABSM.  3/15/2021  15:21 EDT

## 2021-06-21 ENCOUNTER — OFFICE VISIT (OUTPATIENT)
Dept: PULMONOLOGY | Facility: HOSPITAL | Age: 61
End: 2021-06-21

## 2021-06-21 VITALS
TEMPERATURE: 98.6 F | BODY MASS INDEX: 19.83 KG/M2 | RESPIRATION RATE: 15 BRPM | OXYGEN SATURATION: 94 % | HEART RATE: 66 BPM | HEIGHT: 65 IN | WEIGHT: 119 LBS | DIASTOLIC BLOOD PRESSURE: 69 MMHG | SYSTOLIC BLOOD PRESSURE: 124 MMHG

## 2021-06-21 DIAGNOSIS — I10 ESSENTIAL HYPERTENSION: ICD-10-CM

## 2021-06-21 DIAGNOSIS — J96.12 CHRONIC RESPIRATORY FAILURE WITH HYPOXIA AND HYPERCAPNIA (HCC): Primary | ICD-10-CM

## 2021-06-21 DIAGNOSIS — J44.9 CHRONIC OBSTRUCTIVE PULMONARY DISEASE, UNSPECIFIED COPD TYPE (HCC): ICD-10-CM

## 2021-06-21 DIAGNOSIS — Z72.0 TOBACCO ABUSE: ICD-10-CM

## 2021-06-21 DIAGNOSIS — J96.11 CHRONIC RESPIRATORY FAILURE WITH HYPOXIA AND HYPERCAPNIA (HCC): Primary | ICD-10-CM

## 2021-06-21 PROCEDURE — G0463 HOSPITAL OUTPT CLINIC VISIT: HCPCS

## 2021-06-21 RX ORDER — FUROSEMIDE 20 MG/1
1 TABLET ORAL DAILY
COMMUNITY
Start: 2021-06-02

## 2021-06-21 NOTE — PROGRESS NOTES
PULMONARY  CONSULT NOTE      PATIENT IDENTIFICATION:  Name: Jocelin Wills  Age: 61 y.o.  Sex: female  :  1960  MRN: [id number]    DATE OF CONSULTATION:  2021                     CHIEF COMPLAINT: Follow-up on COPD    History of Present Illness:   Jocelin Wills is a 61 y.o. female patient with chronic respiratory failure on home oxygen, she is ex-smoker not smoke anymore, using her Breo, daily and using her DuoNeb nebulizer as needed, she is using it like once or twice a day, coughing sometimes dry or clear mucus, no fever no chills no dizziness no lightheadedness no hemoptysis      Review of Systems:   Constitutional:  As above   Eyes: negative   ENT/oropharynx: negative   Cardiovascular: negative   Respiratory:  As above   Gastrointestinal: negative   Genitourinary: negative   Neurological: negative   Musculoskeletal: negative   Integument/breast: negative   Endocrine: negative   Allergic/Immunologic: negative     Past Medical History:  Past Medical History:   Diagnosis Date   • CAD (coronary artery disease)    • Colon cancer (CMS/HCC) 2016    Colon Cancer Tumors we found on 2016   • COPD (chronic obstructive pulmonary disease) (CMS/[facility])    • Hyperlipidemia    • Hypertension    • Myocardial infarction (CMS/[facility])        Past Surgical History:  Past Surgical History:   Procedure Laterality Date   • BACK SURGERY     • CARDIAC CATHETERIZATION     • CARDIAC CATHETERIZATION Left 2020    Procedure: Left Heart Cath and coronary angiogram;  Surgeon: Jose Paredes MD;  Location: [facility] CATH INVASIVE LOCATION;  Service: Cardiovascular;  Laterality: Left;   • CARDIAC CATHETERIZATION N/A 2020    Procedure: Left ventriculography;  Surgeon: Jose Paredes MD;  Location: [facility] CATH INVASIVE LOCATION;  Service: Cardiovascular;  Laterality: N/A;   • COLON SURGERY     • COLONOSCOPY     • CORONARY ANGIOPLASTY      Stent Placement   • ENDOSCOPY     • HYSTERECTOMY     • LUNG SURGERY     • NECK  SURGERY     • OTHER SURGICAL HISTORY  12/26/2018    Rm 18 post op right L2-L3 microdiscectomy 12/26/2018        Family History:  Family History   Problem Relation Age of Onset   • Lung disease Mother         Lung/Respiratory Disease   • Diabetes Father    • Heart disease Father    • Heart disease Sister    • Diabetes Sister         Social History:   Social History     Tobacco Use   • Smoking status: Current Every Day Smoker     Packs/day: 0.50     Types: Cigarettes   • Smokeless tobacco: Never Used   Substance Use Topics   • Alcohol use: Never        Allergies:  Allergies   Allergen Reactions   • Latex Rash       Home Meds:  (Not in a hospital admission)      Objective:    Vitals Ranges:   Temp:  [98.6 °F (37 °C)] 98.6 °F (37 °C)  Heart Rate:  [66] 66  Resp:  [15] 15  BP: (124)/(69) 124/69  Body mass index is 19.8 kg/m².     Exam:  General Appearance:  WDWN    HEENT:   without obvious abnormality,  Conjunctiva/corneas clear,  Normal external ear canals, no drainage    Clear orsalmucosa,  Mallampati score 3    Neck:  Supple, symmetrical, trachea midline. No JVD.  Lungs:   Bilateral basal rhonchi bilaterally, respirations unlabored symmetrical wall movement.    Chest wall:  No tenderness or deformity.    Heart:  Regular rate and rhythm, S1 and S2 normal.  Extremities: Trace edema no clubbing or Cyanosis        Data Review:  All labs (24hrs): No results found for this or any previous visit (from the past 24 hour(s)).     Imaging:  Pulmonary Function Test   Pulmonary function test 12/08/2020     Spirometry  FEV1 0.54 L which is 23% predicted there was minimal response to   bronchodilator with increase in FEV1 to  27%    FEV1/FVC ratio 49% predicted    Lung volumes  Total lung capacity 94% predicted  Residual volume 150% predicted    Diffusion capacity 43% predicted.      Flow volume loop suggestive of obstruction.      Impression  Above finding suggestive of very severe or end-stage COPD with minimum   response  bronchodilators there is evidence of air trapping       ASSESSMENT:  Diagnoses and all orders for this visit:    Chronic respiratory failure with hypoxia and hypercapnia (CMS/HCC)    Chronic obstructive pulmonary disease, unspecified COPD type (CMS/HCC)    Tobacco abuse    Essential hypertension    Other orders  -     furosemide (LASIX) 20 MG tablet; Take 1 tablet by mouth Daily.        PLAN:  Continue home oxygen 2 L at rest and 3L with exercise  Bronchodilator inhaled corticosteroid    Education how to use inhalers    Encouraged to use incentive spirometer    Continue to exercise slowly as tolerated    Monitor for any change in the color of the sputum    Avoid any exposure to fumes, gas or any irritant      Follow-up 6 months    Pacheco Crockett MD. D, ABSM.  6/21/2021  14:49 EDT

## 2021-12-21 ENCOUNTER — OFFICE VISIT (OUTPATIENT)
Dept: PULMONOLOGY | Facility: HOSPITAL | Age: 61
End: 2021-12-21

## 2021-12-21 VITALS
OXYGEN SATURATION: 97 % | HEIGHT: 65 IN | HEART RATE: 67 BPM | RESPIRATION RATE: 13 BRPM | WEIGHT: 106 LBS | DIASTOLIC BLOOD PRESSURE: 67 MMHG | BODY MASS INDEX: 17.66 KG/M2 | SYSTOLIC BLOOD PRESSURE: 124 MMHG

## 2021-12-21 DIAGNOSIS — J96.12 CHRONIC RESPIRATORY FAILURE WITH HYPOXIA AND HYPERCAPNIA (HCC): ICD-10-CM

## 2021-12-21 DIAGNOSIS — F17.218 NICOTINE DEPENDENCE, CIGARETTES, WITH OTHER NICOTINE-INDUCED DISORDERS: ICD-10-CM

## 2021-12-21 DIAGNOSIS — J44.9 CHRONIC OBSTRUCTIVE PULMONARY DISEASE, UNSPECIFIED COPD TYPE (HCC): Primary | ICD-10-CM

## 2021-12-21 DIAGNOSIS — J96.11 CHRONIC RESPIRATORY FAILURE WITH HYPOXIA AND HYPERCAPNIA (HCC): ICD-10-CM

## 2021-12-21 DIAGNOSIS — Z72.0 TOBACCO ABUSE: ICD-10-CM

## 2021-12-21 PROCEDURE — G0463 HOSPITAL OUTPT CLINIC VISIT: HCPCS

## 2021-12-21 NOTE — PROGRESS NOTES
SLEEP/PULMONARY  CLINIC NOTE      PATIENT IDENTIFICATION:  Name: Jocelin Wills  Age: 61 y.o.  Sex: female  :  1960  MRN: [id number]    DATE OF CONSULTATION:  2021                     CHIEF COMPLAINT: COPD    History of Present Illness:   Jocelin Wills is a 61 y.o. female pt with few months history of shortness of breath with activity, with cough with white sputum, no hemotysis, no fever no chills no chest pain no gastroesophageal reflex.  Patient with chronic respiratory failure on trilogy machine at night  Review of Systems:   Constitutional:  As above   Eyes: negative   ENT/oropharynx: negative   Cardiovascular: negative   Respiratory:  As above   Gastrointestinal: negative   Genitourinary: negative   Neurological: negative   Musculoskeletal: negative   Integument/breast: negative   Endocrine: negative   Allergic/Immunologic: negative     Past Medical History:  Past Medical History:   Diagnosis Date   • CAD (coronary artery disease)    • Colon cancer (HCC) 2016    Colon Cancer Tumors we found on 2016   • COPD (chronic obstructive pulmonary disease) (HCC)    • Hyperlipidemia    • Hypertension    • Myocardial infarction (HCC)        Past Surgical History:  Past Surgical History:   Procedure Laterality Date   • BACK SURGERY     • CARDIAC CATHETERIZATION     • CARDIAC CATHETERIZATION Left 2020    Procedure: Left Heart Cath and coronary angiogram;  Surgeon: Jose Paredes MD;  Location: [facility] CATH INVASIVE LOCATION;  Service: Cardiovascular;  Laterality: Left;   • CARDIAC CATHETERIZATION N/A 2020    Procedure: Left ventriculography;  Surgeon: Jose Paredes MD;  Location: [facility] CATH INVASIVE LOCATION;  Service: Cardiovascular;  Laterality: N/A;   • COLON SURGERY     • COLONOSCOPY     • CORONARY ANGIOPLASTY      Stent Placement   • ENDOSCOPY     • HYSTERECTOMY     • LUNG SURGERY     • NECK SURGERY     • OTHER SURGICAL HISTORY  2018    Rm 18 post op right L2-L3  microdiscectomy 12/26/2018        Family History:  Family History   Problem Relation Age of Onset   • Lung disease Mother         Lung/Respiratory Disease   • Diabetes Father    • Heart disease Father    • Heart disease Sister    • Diabetes Sister         Social History:   Social History     Tobacco Use   • Smoking status: Current Every Day Smoker     Packs/day: 0.50     Types: Cigarettes   • Smokeless tobacco: Never Used   Substance Use Topics   • Alcohol use: Never        Allergies:  Allergies   Allergen Reactions   • Latex Rash       Home Meds:  (Not in a hospital admission)      Objective:    Vitals Ranges:   Heart Rate:  [67] 67  Resp:  [13] 13  BP: (124)/(67) 124/67  Body mass index is 17.64 kg/m².     Exam:  General Appearance:  WDWN    HEENT:   without obvious abnormality,  Conjunctiva/corneas clear,  Normal external ear canals, no drainage    Clear orsalmucosa,  Mallampati score 3    Neck:  Supple, symmetrical, trachea midline. No JVD.  Lungs:   Bilateral basal rhonchi bilaterally, respirations unlabored symmetrical wall movement.    Chest wall:  No tenderness or deformity.    Heart:  Regular rate and rhythm, S1 and S2 normal.  Extremities: Trace edema no clubbing or Cyanosis        Data Review:  All labs (24hrs): No results found for this or any previous visit (from the past 24 hour(s)).     Imaging:  Pulmonary Function Test   Pulmonary function test 12/08/2020     Spirometry  FEV1 0.54 L which is 23% predicted there was minimal response to   bronchodilator with increase in FEV1 to  27%    FEV1/FVC ratio 49% predicted    Lung volumes  Total lung capacity 94% predicted  Residual volume 150% predicted    Diffusion capacity 43% predicted.      Flow volume loop suggestive of obstruction.      Impression  Above finding suggestive of very severe or end-stage COPD with minimum   response bronchodilators there is evidence of air trapping       ASSESSMENT:  Diagnoses and all orders for this visit:    Chronic  obstructive pulmonary disease, unspecified COPD type (HCC)    Tobacco abuse    Chronic respiratory failure with hypoxia and hypercapnia (HCC)        PLAN:   This is patient with symptoms of obstructive sleep apnea, continue trilogy at night avoid supine avoid sedative meds in pm, weight loss, Avoid driving. Long discussion with patient about the physiology of THALIA, and long term and short term   benefit of treating THALIA   continue oxygen supplement  Bronchodilator inhaled corticosteroid on Breo  Education how to use inhalers  Encouraged to use incentive spirometer  Continue to exercise slowly as tolerated  Monitor for any change in the color of the sputum  Avoid any exposure to fumes, gas or any irritant   get the CT scan for screening  Education patient  About tips for  smoking cessation and risk factors and benefit, was to a discussion with the patient.    Follow-up 3-month    Pacheco Crockett MD. D, ABSM.  12/21/2021  14:48 EST

## 2022-01-05 ENCOUNTER — HOSPITAL ENCOUNTER (OUTPATIENT)
Dept: CT IMAGING | Facility: HOSPITAL | Age: 62
Discharge: HOME | End: 2022-01-05
Admitting: INTERNAL MEDICINE

## 2022-01-05 DIAGNOSIS — F17.218 NICOTINE DEPENDENCE, CIGARETTES, WITH OTHER NICOTINE-INDUCED DISORDERS: ICD-10-CM

## 2022-01-05 DIAGNOSIS — Z72.0 TOBACCO ABUSE: ICD-10-CM

## 2022-01-05 DIAGNOSIS — J44.9 CHRONIC OBSTRUCTIVE PULMONARY DISEASE, UNSPECIFIED COPD TYPE: ICD-10-CM

## 2022-01-05 PROCEDURE — 71271 CT THORAX LUNG CANCER SCR C-: CPT

## 2022-02-09 ENCOUNTER — LAB (OUTPATIENT)
Dept: LAB | Facility: HOSPITAL | Age: 62
End: 2022-02-09

## 2022-02-09 LAB — SARS-COV-2 ORF1AB RESP QL NAA+PROBE: NOT DETECTED

## 2022-02-09 PROCEDURE — U0004 COV-19 TEST NON-CDC HGH THRU: HCPCS

## 2022-02-09 PROCEDURE — C9803 HOPD COVID-19 SPEC COLLECT: HCPCS

## 2022-03-23 ENCOUNTER — OFFICE VISIT (OUTPATIENT)
Dept: PULMONOLOGY | Facility: HOSPITAL | Age: 62
End: 2022-03-23

## 2022-03-23 VITALS
BODY MASS INDEX: 18.61 KG/M2 | HEART RATE: 71 BPM | RESPIRATION RATE: 10 BRPM | SYSTOLIC BLOOD PRESSURE: 115 MMHG | HEIGHT: 63 IN | OXYGEN SATURATION: 92 % | DIASTOLIC BLOOD PRESSURE: 81 MMHG | WEIGHT: 105 LBS

## 2022-03-23 DIAGNOSIS — J96.11 CHRONIC RESPIRATORY FAILURE WITH HYPOXIA AND HYPERCAPNIA: Primary | ICD-10-CM

## 2022-03-23 DIAGNOSIS — Z72.0 TOBACCO ABUSE: ICD-10-CM

## 2022-03-23 DIAGNOSIS — J44.9 CHRONIC OBSTRUCTIVE PULMONARY DISEASE, UNSPECIFIED COPD TYPE: ICD-10-CM

## 2022-03-23 DIAGNOSIS — J96.12 CHRONIC RESPIRATORY FAILURE WITH HYPOXIA AND HYPERCAPNIA: Primary | ICD-10-CM

## 2022-03-23 PROCEDURE — G0463 HOSPITAL OUTPT CLINIC VISIT: HCPCS

## 2022-03-23 RX ORDER — BUPROPION HYDROCHLORIDE 100 MG/1
1 TABLET, EXTENDED RELEASE ORAL EVERY 12 HOURS
COMMUNITY
Start: 2022-02-23 | End: 2023-01-30 | Stop reason: HOSPADM

## 2022-03-23 NOTE — PROGRESS NOTES
SLEEP/PULMONARY  CLINIC NOTE      PATIENT IDENTIFICATION:  Name: Jocelin Wills  Age: 61 y.o.  Sex: female  :  1960  MRN: [id number]    DATE OF CONSULTATION:  3/23/2022                     CHIEF COMPLAINT: Chronic obstructive airway disease    History of Present Illness:   Jocelin Wills is a 61 y.o. female current smoker history of chronic respiratory failure on oxygen and trilogy machine using it at night feeling better still having some shortness of breath coughing with white clear sputum's, still dyspnea exertion, no hemoptysis still poor appetite no change in her weight      Review of Systems:   Constitutional:  As above   Eyes: negative   ENT/oropharynx: negative   Cardiovascular: negative   Respiratory:  As above   Gastrointestinal: negative   Genitourinary: negative   Neurological: negative   Musculoskeletal: negative   Integument/breast: negative   Endocrine: negative   Allergic/Immunologic: negative     Past Medical History:  Past Medical History:   Diagnosis Date   • CAD (coronary artery disease)    • Colon cancer (HCC) 2016    Colon Cancer Tumors we found on 2016   • COPD (chronic obstructive pulmonary disease) (HCC)    • Hyperlipidemia    • Hypertension    • Myocardial infarction (HCC)    • PONV (postoperative nausea and vomiting)    • Sleep apnea        Past Surgical History:  Past Surgical History:   Procedure Laterality Date   • BACK SURGERY     • CARDIAC CATHETERIZATION     • CARDIAC CATHETERIZATION Left 2020    Procedure: Left Heart Cath and coronary angiogram;  Surgeon: Jose Paredes MD;  Location: [facility] CATH INVASIVE LOCATION;  Service: Cardiovascular;  Laterality: Left;   • CARDIAC CATHETERIZATION N/A 2020    Procedure: Left ventriculography;  Surgeon: Jose Paredes MD;  Location: [facility] CATH INVASIVE LOCATION;  Service: Cardiovascular;  Laterality: N/A;   • COLON SURGERY     • COLONOSCOPY     • CORONARY ANGIOPLASTY      Stent Placement   • ENDOSCOPY     •  HYSTERECTOMY     • LUNG SURGERY     • NECK SURGERY     • OTHER SURGICAL HISTORY  12/26/2018    Rm 18 post op right L2-L3 microdiscectomy 12/26/2018        Family History:  Family History   Problem Relation Age of Onset   • Lung disease Mother         Lung/Respiratory Disease   • Diabetes Father    • Heart disease Father    • Heart disease Sister    • Diabetes Sister         Social History:   Social History     Tobacco Use   • Smoking status: Current Every Day Smoker     Packs/day: 0.50     Types: Cigarettes   • Smokeless tobacco: Never Used   Substance Use Topics   • Alcohol use: Never        Allergies:  Allergies   Allergen Reactions   • Latex Rash       Home Meds:  (Not in a hospital admission)      Objective:    Vitals Ranges:   Heart Rate:  [71] 71  Resp:  [10] 10  BP: (115)/(81) 115/81  Body mass index is 18.6 kg/m².     Exam:  General Appearance:  WDWN    HEENT:   without obvious abnormality,  Conjunctiva/corneas clear,  Normal external ear canals, no drainage    Clear orsalmucosa,  Mallampati score 3    Neck:  Supple, symmetrical, trachea midline. No JVD.  Lungs:   Bilateral basal rhonchi bilaterally, respirations unlabored symmetrical wall movement.    Chest wall:  No tenderness or deformity.    Heart:  Regular rate and rhythm, S1 and S2 normal.  Extremities: Trace edema no clubbing or Cyanosis        Data Review:  All labs (24hrs): No results found for this or any previous visit (from the past 24 hour(s)).     Imaging:  CT Chest Low Dose Cancer Screening WO  Narrative: CT CHEST LOW DOSE CANCER SCREENING WO-     Date of Exam: 1/5/2022 10:05 AM     Indication: Lung cancer screening, >= 30 pk-yr current smoker (Age  55-80y); J44.9-Chronic obstructive pulmonary disease, unspecified;  Z72.0-Tobacco use; F17.218-Nicotine dependence, cigarettes, with other  nicotine-induced disorders     Comparison: CT chest 6/10/2020.     Technique: Low dose CT imaging of the chest was performed without  intravenous contrast  enhancement.  Automated exposure control and  iterative reconstruction methods were used.     FINDINGS:       Right lower lobe wedge resection changes are again identified with some  chronic scarring. There are no noncalcified pulmonary nodules. Mild  centrilobular emphysema is present. Tiny calcified granulomas are  present in the right lung. Benign calcified lymph nodes are present  within the right hilum and within the mediastinum. There are no  pathologically enlarged lymph nodes.     Heart size is normal. Coronary artery stents are in place. Thoracic  aortic caliber is within normal limits.     Included portions the upper abdominal organs have a normal noncontrast  appearance.     There are no acute or suspicious osseous abnormalities. 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        Impression:    1. Lung RADS category 1. Negative. No lung noncalcified nodules.  Continued annual screening with low-dose CT chest is recommended.  2. Additional findings include: Wedge resection changes of the right  lower lobe, mild emphysema, coronary artery stents.        Electronically Signed By-Светлана Herbert MD On:1/5/2022 10:32 AM  This report was finalized on [id number] by  Светлана Herbert MD.       ASSESSMENT:  Diagnoses and all orders for this visit:    Chronic respiratory failure with hypoxia and hypercapnia (HCC)    Chronic obstructive pulmonary disease, unspecified COPD type (HCC)    Tobacco abuse    Other orders  -     buPROPion SR (WELLBUTRIN SR) 100 MG 12 hr tablet; Take 1 tablet by mouth Every 12 (Twelve) Hours.        PLAN:  Continue with a trilogy machine    Bronchodilator inhaled corticosteroid    Education how to use inhalers    Encouraged to use incentive spirometer    Continue to exercise slowly as tolerated    Monitor for any change in the color of the sputum    Avoid any exposure to fumes, gas or any irritant      Education patient  About tips for  smoking cessation and risk  factors and benefit, was to a discussion with the patient.  Patient has a CAT scan was done in December was negative for lung nodules    Follow-up 6 months    Pacheco Crockett MD. D, ABSM.  3/23/2022  14:50 EDT

## 2022-09-28 ENCOUNTER — OFFICE VISIT (OUTPATIENT)
Dept: PULMONOLOGY | Facility: HOSPITAL | Age: 62
End: 2022-09-28

## 2022-09-28 VITALS
HEART RATE: 75 BPM | DIASTOLIC BLOOD PRESSURE: 69 MMHG | BODY MASS INDEX: 18.61 KG/M2 | OXYGEN SATURATION: 96 % | WEIGHT: 105 LBS | HEIGHT: 63 IN | SYSTOLIC BLOOD PRESSURE: 110 MMHG | RESPIRATION RATE: 16 BRPM

## 2022-09-28 DIAGNOSIS — J44.9 CHRONIC OBSTRUCTIVE PULMONARY DISEASE, UNSPECIFIED COPD TYPE: Primary | ICD-10-CM

## 2022-09-28 DIAGNOSIS — J96.12 CHRONIC RESPIRATORY FAILURE WITH HYPOXIA AND HYPERCAPNIA: ICD-10-CM

## 2022-09-28 DIAGNOSIS — E43 SEVERE MALNUTRITION: ICD-10-CM

## 2022-09-28 DIAGNOSIS — J96.11 CHRONIC RESPIRATORY FAILURE WITH HYPOXIA AND HYPERCAPNIA: ICD-10-CM

## 2022-09-28 PROCEDURE — G0463 HOSPITAL OUTPT CLINIC VISIT: HCPCS

## 2022-09-28 NOTE — PROGRESS NOTES
SLEEP/PULMONARY  CLINIC NOTE      PATIENT IDENTIFICATION:  Name: Jocelin Wills  Age: 62 y.o.  Sex: female  :  1960  MRN: [id number]    DATE OF CONSULTATION:  2022                     CHIEF COMPLAINT: Chronic struct of airway disease    History of Present Illness:   Jocelin Wills is a 62 y.o. female patient chronic respiratory failure chronic obstructive airway disease on home oxygen, still having dyspnea exertion coughing she stopped taking her Pulmicort because she thought it might causing her shaking, she feeling more weak more tired and she think she is losing weight no fever no chills dry cough on and off no hemoptysis      Review of Systems:   Constitutional:  As above   Eyes: negative   ENT/oropharynx: negative   Cardiovascular: negative   Respiratory:  As above   Gastrointestinal: negative   Genitourinary: negative   Neurological: negative   Musculoskeletal: negative   Integument/breast: negative   Endocrine: negative   Allergic/Immunologic: negative     Past Medical History:  Past Medical History:   Diagnosis Date   • CAD (coronary artery disease)    • Colon cancer (HCC) 2016    Colon Cancer Tumors we found on 2016   • COPD (chronic obstructive pulmonary disease) (HCC)    • Hyperlipidemia    • Hypertension    • Myocardial infarction (HCC)    • PONV (postoperative nausea and vomiting)    • Sleep apnea        Past Surgical History:  Past Surgical History:   Procedure Laterality Date   • BACK SURGERY     • CARDIAC CATHETERIZATION     • CARDIAC CATHETERIZATION Left 2020    Procedure: Left Heart Cath and coronary angiogram;  Surgeon: Jose Paredes MD;  Location: [facility] CATH INVASIVE LOCATION;  Service: Cardiovascular;  Laterality: Left;   • CARDIAC CATHETERIZATION N/A 2020    Procedure: Left ventriculography;  Surgeon: Jose Paredes MD;  Location: [facility] CATH INVASIVE LOCATION;  Service: Cardiovascular;  Laterality: N/A;   • COLON SURGERY     • COLONOSCOPY     •  CORONARY ANGIOPLASTY      Stent Placement   • ENDOSCOPY     • HYSTERECTOMY     • LUNG SURGERY     • NECK SURGERY     • OTHER SURGICAL HISTORY  12/26/2018    Rm 18 post op right L2-L3 microdiscectomy 12/26/2018        Family History:  Family History   Problem Relation Age of Onset   • Lung disease Mother         Lung/Respiratory Disease   • Diabetes Father    • Heart disease Father    • Heart disease Sister    • Diabetes Sister         Social History:   Social History     Tobacco Use   • Smoking status: Current Every Day Smoker     Packs/day: 0.50     Types: Cigarettes   • Smokeless tobacco: Never Used   Substance Use Topics   • Alcohol use: Never        Allergies:  Allergies   Allergen Reactions   • Latex Rash       Home Meds:  (Not in a hospital admission)      Objective:    Vitals Ranges:   Heart Rate:  [75] 75  Resp:  [16] 16  BP: (110)/(69) 110/69  Body mass index is 18.6 kg/m².     Exam:  General Appearance:  WDWN    HEENT:   without obvious abnormality,  Conjunctiva/corneas clear,  Normal external ear canals, no drainage    Clear orsalmucosa,  Mallampati score 3    Neck:  Supple, symmetrical, trachea midline. No JVD.  Lungs:   Bilateral basal rhonchi bilaterally, respirations unlabored symmetrical wall movement.    Chest wall:  No tenderness or deformity.    Heart:  Regular rate and rhythm, S1 and S2 normal.  Extremities: Trace edema no clubbing or Cyanosis        Data Review:  All labs (24hrs): No results found for this or any previous visit (from the past 24 hour(s)).     Imaging:  CT Chest Low Dose Cancer Screening WO  Narrative: CT CHEST LOW DOSE CANCER SCREENING WO-     Date of Exam: 1/5/2022 10:05 AM     Indication: Lung cancer screening, >= 30 pk-yr current smoker (Age  55-80y); J44.9-Chronic obstructive pulmonary disease, unspecified;  Z72.0-Tobacco use; F17.218-Nicotine dependence, cigarettes, with other  nicotine-induced disorders     Comparison: CT chest 6/10/2020.     Technique: Low dose CT imaging  of the chest was performed without  intravenous contrast enhancement.  Automated exposure control and  iterative reconstruction methods were used.     FINDINGS:       Right lower lobe wedge resection changes are again identified with some  chronic scarring. There are no noncalcified pulmonary nodules. Mild  centrilobular emphysema is present. Tiny calcified granulomas are  present in the right lung. Benign calcified lymph nodes are present  within the right hilum and within the mediastinum. There are no  pathologically enlarged lymph nodes.     Heart size is normal. Coronary artery stents are in place. Thoracic  aortic caliber is within normal limits.     Included portions the upper abdominal organs have a normal noncontrast  appearance.     There are no acute or suspicious osseous abnormalities. 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        Impression:    1. Lung RADS category 1. Negative. No lung noncalcified nodules.  Continued annual screening with low-dose CT chest is recommended.  2. Additional findings include: Wedge resection changes of the right  lower lobe, mild emphysema, coronary artery stents.        Electronically Signed By-Светлана Herbert MD On:1/5/2022 10:32 AM  This report was finalized on [id number] by  Светлана Herbert MD.       ASSESSMENT:  Diagnoses and all orders for this visit:    Chronic obstructive pulmonary disease, unspecified COPD type (HCC)    Chronic respiratory failure with hypoxia and hypercapnia (HCC)    Severe malnutrition (HCC)        PLAN:  We will arrange for pulmonary rehab  Bronchodilator inhaled corticosteroid    Education how to use inhalers    Encouraged to use incentive spirometer    Continue to exercise slowly as tolerated    Monitor for any change in the color of the sputum    Avoid any exposure to fumes, gas or any irritant    Cussed with the patient what nutrition    Follow-up 3-month    Pacheco Crockett MD. D, ABSM.  9/28/2022  10:34 EDT

## 2022-09-29 ENCOUNTER — TELEPHONE (OUTPATIENT)
Dept: PULMONOLOGY | Facility: HOSPITAL | Age: 62
End: 2022-09-29

## 2022-10-03 ENCOUNTER — TELEPHONE (OUTPATIENT)
Dept: CARDIAC REHAB | Facility: HOSPITAL | Age: 62
End: 2022-10-03

## 2022-10-04 ENCOUNTER — TELEPHONE (OUTPATIENT)
Dept: CARDIAC REHAB | Facility: HOSPITAL | Age: 62
End: 2022-10-04

## 2022-10-04 NOTE — TELEPHONE ENCOUNTER
Covered @ 100%  Follows  Guidelines: 36v/36wks, addt'l 36v available based on medical necessity  Ref #: [id number]  ~hal

## 2022-11-14 ENCOUNTER — TRANSCRIBE ORDERS (OUTPATIENT)
Dept: CARDIAC REHAB | Facility: HOSPITAL | Age: 62
End: 2022-11-14

## 2022-11-14 DIAGNOSIS — J96.11 CHRONIC RESPIRATORY FAILURE WITH HYPOXIA: Primary | ICD-10-CM

## 2022-12-20 ENCOUNTER — APPOINTMENT (OUTPATIENT)
Dept: PULMONOLOGY | Facility: HOSPITAL | Age: 62
End: 2022-12-20

## 2023-01-30 ENCOUNTER — OFFICE VISIT (OUTPATIENT)
Dept: PULMONOLOGY | Facility: HOSPITAL | Age: 63
End: 2023-01-30
Payer: MEDICARE

## 2023-01-30 VITALS
HEART RATE: 63 BPM | RESPIRATION RATE: 16 BRPM | WEIGHT: 105 LBS | SYSTOLIC BLOOD PRESSURE: 115 MMHG | HEIGHT: 63 IN | OXYGEN SATURATION: 98 % | DIASTOLIC BLOOD PRESSURE: 73 MMHG | BODY MASS INDEX: 18.61 KG/M2

## 2023-01-30 DIAGNOSIS — J96.12 CHRONIC RESPIRATORY FAILURE WITH HYPOXIA AND HYPERCAPNIA: ICD-10-CM

## 2023-01-30 DIAGNOSIS — Z72.0 TOBACCO ABUSE: ICD-10-CM

## 2023-01-30 DIAGNOSIS — J44.9 CHRONIC OBSTRUCTIVE PULMONARY DISEASE, UNSPECIFIED COPD TYPE: Primary | ICD-10-CM

## 2023-01-30 DIAGNOSIS — F17.210 TOBACCO DEPENDENCE DUE TO CIGARETTES: ICD-10-CM

## 2023-01-30 DIAGNOSIS — J96.11 CHRONIC RESPIRATORY FAILURE WITH HYPOXIA AND HYPERCAPNIA: ICD-10-CM

## 2023-01-30 PROCEDURE — G0463 HOSPITAL OUTPT CLINIC VISIT: HCPCS

## 2023-01-30 RX ORDER — BUDESONIDE 0.5 MG/2ML
0.5 INHALANT ORAL
Qty: 60 EACH | Refills: 11 | Status: SHIPPED | OUTPATIENT
Start: 2023-01-30

## 2023-01-30 NOTE — PROGRESS NOTES
SLEEP/PULMONARY  CLINIC NOTE      PATIENT IDENTIFICATION:  Name: Jocelin Wills  Age: 62 y.o.  Sex: female  :  1960  MRN: [id number]    DATE OF CONSULTATION:  2023                     CHIEF COMPLAINT: Shortness of breath    History of Present Illness:   Jocelin Wills is a 62 y.o. female patient chronic obstructive airway disease he still smoking, still having some dyspnea exertion using her oxygen 2 to 4 L resting and exercise, she is using her trilogy machine at night and she is very [employer] about it, some coughing no sputum's no abscesses no significant change in her weight even reporting her appetite is improving      Review of Systems:   Constitutional:  As above   Eyes: negative   ENT/oropharynx: negative   Cardiovascular: negative   Respiratory:  As above   Gastrointestinal: negative   Genitourinary: negative   Neurological: negative   Musculoskeletal: negative   Integument/breast: negative   Endocrine: negative   Allergic/Immunologic: negative     Past Medical History:  Past Medical History:   Diagnosis Date   • CAD (coronary artery disease)    • Colon cancer (HCC) 2016    Colon Cancer Tumors we found on 2016   • COPD (chronic obstructive pulmonary disease) (HCC)    • Hyperlipidemia    • Hypertension    • Myocardial infarction (HCC)    • PONV (postoperative nausea and vomiting)    • Sleep apnea        Past Surgical History:  Past Surgical History:   Procedure Laterality Date   • BACK SURGERY     • CARDIAC CATHETERIZATION     • CARDIAC CATHETERIZATION Left 2020    Procedure: Left Heart Cath and coronary angiogram;  Surgeon: Jose Paredes MD;  Location: [facility] CATH INVASIVE LOCATION;  Service: Cardiovascular;  Laterality: Left;   • CARDIAC CATHETERIZATION N/A 2020    Procedure: Left ventriculography;  Surgeon: Jose Paredes MD;  Location: [facility] CATH INVASIVE LOCATION;  Service: Cardiovascular;  Laterality: N/A;   • COLON SURGERY     • COLONOSCOPY     • CORONARY  ANGIOPLASTY      Stent Placement   • ENDOSCOPY     • HYSTERECTOMY     • LUNG SURGERY     • NECK SURGERY     • OTHER SURGICAL HISTORY  12/26/2018    Rm 18 post op right L2-L3 microdiscectomy 12/26/2018        Family History:  Family History   Problem Relation Age of Onset   • Lung disease Mother         Lung/Respiratory Disease   • Diabetes Father    • Heart disease Father    • Heart disease Sister    • Diabetes Sister         Social History:   Social History     Tobacco Use   • Smoking status: Every Day     Packs/day: 0.50     Types: Cigarettes   • Smokeless tobacco: Never   Substance Use Topics   • Alcohol use: Never        Allergies:  Allergies   Allergen Reactions   • Latex Rash       Home Meds:  (Not in a hospital admission)      Objective:    Vitals Ranges:   Heart Rate:  [63] 63  Resp:  [16] 16  BP: (115)/(73) 115/73  Body mass index is 18.6 kg/m².     Exam:  General Appearance:  WDWN    HEENT:   without obvious abnormality,  Conjunctiva/corneas clear,  Normal external ear canals, no drainage    Clear orsalmucosa,  Mallampati score 3    Neck:  Supple, symmetrical, trachea midline. No JVD.  Lungs:   Bilateral basal rhonchi bilaterally, respirations unlabored symmetrical wall movement.    Chest wall:  No tenderness or deformity.    Heart:  Regular rate and rhythm, S1 and S2 normal.  Extremities: Trace edema no clubbing or Cyanosis        Data Review:  All labs (24hrs): No results found for this or any previous visit (from the past 24 hour(s)).     Imaging:  CT Chest Low Dose Cancer Screening WO  Narrative: CT CHEST LOW DOSE CANCER SCREENING WO-     Date of Exam: 1/5/2022 10:05 AM     Indication: Lung cancer screening, >= 30 pk-yr current smoker (Age  55-80y); J44.9-Chronic obstructive pulmonary disease, unspecified;  Z72.0-Tobacco use; F17.218-Nicotine dependence, cigarettes, with other  nicotine-induced disorders     Comparison: CT chest 6/10/2020.     Technique: Low dose CT imaging of the chest was performed  without  intravenous contrast enhancement.  Automated exposure control and  iterative reconstruction methods were used.     FINDINGS:       Right lower lobe wedge resection changes are again identified with some  chronic scarring. There are no noncalcified pulmonary nodules. Mild  centrilobular emphysema is present. Tiny calcified granulomas are  present in the right lung. Benign calcified lymph nodes are present  within the right hilum and within the mediastinum. There are no  pathologically enlarged lymph nodes.     Heart size is normal. Coronary artery stents are in place. Thoracic  aortic caliber is within normal limits.     Included portions the upper abdominal organs have a normal noncontrast  appearance.     There are no acute or suspicious osseous abnormalities. 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        Impression:    1. Lung RADS category 1. Negative. No lung noncalcified nodules.  Continued annual screening with low-dose CT chest is recommended.  2. Additional findings include: Wedge resection changes of the right  lower lobe, mild emphysema, coronary artery stents.        Electronically Signed By-Светлана Herbert MD On:1/5/2022 10:32 AM  This report was finalized on [id number] by  Светлана Herbert MD.       ASSESSMENT:  Diagnoses and all orders for this visit:    Chronic obstructive pulmonary disease, unspecified COPD type (HCC)  -     budesonide (Pulmicort) 0.5 MG/2ML nebulizer solution; Take 2 mL by nebulization Daily. DX J44.9  -      CT Chest Low Dose Cancer Screening WO; Future    Chronic respiratory failure with hypoxia and hypercapnia (HCC)    Tobacco abuse  -      CT Chest Low Dose Cancer Screening WO; Future    Tobacco dependence due to cigarettes  -      CT Chest Low Dose Cancer Screening WO; Future        PLAN:  Continuous oxygen supplement 2 to 4 L  Bronchodilator inhaled corticosteroid    Education how to use inhalers    Encouraged to use incentive  spirometer    Continue to exercise slowly as tolerated    Monitor for any change in the color of the sputum    Avoid any exposure to fumes, gas or any irritant        Continue with the trilogy machine    We will get a CT scan for screening and  Education patient  About tips for  smoking cessation and risk factors and benefit, was to a discussion with the patient.     Follow-up 6 months    Pacheco Crockett MD. D, ABSM.  1/30/2023  13:06 EST

## 2023-02-10 NOTE — PROGRESS NOTES
Subjective:     Encounter Date:02/13/2023      Patient ID: Jocelin Wills is a 62 y.o. female.    Chief Complaint:  History of Present Illness  Jocelin Wills is a 62-year-old female with a past medical history to include CAD s/p PCI, HTN, HLD, COPD, and colon cancer who presents to the office today for evaluation of excessive amounts of sleeping and reports of hands turning purple. Patient states she has been extremely fatigued and experiencing palpitations with any type of exertion for the last 6 months.  Patient also states she has shortness of breath and dyspnea with exertion but this is close to her baseline as she has COPD. Patient was recently seen by pulmonology and they ordered a CT scan of the chest and pulmonary function testing to be completed.  Patient wears home oxygen at 2 to 4 L and uses a CPAP machine at night for sleep apnea.  Patient is a current half pack per day smoker and has been smoking for years and continues to despite diagnosis of COPD.  Patient's most recent cardiac work-up was from June 2020 for which he had an abnormal stress test and cardiac cath showed diffuse disease which is being medically managed.  Patient has also recently noticed that she has intermittent numbness and tingling with occasional color change in bilateral hands. Patient states hands sometimes turn reddish-purple and that if she applies heat her color returns to normal.  When she checks her oxygen saturation with color changes it is between 90 and 92%.  Patient does not see a vascular specialist.  Patient currently denies chest pain, lightheadedness, dizziness, syncope, nausea and vomiting, edema.  Patient takes all medications as prescribed.  Blood pressure and heart rate stable today at 121/75 and 91 bpm.      The following portions of the patient's history were reviewed and updated as appropriate: allergies, current medications, past family history, past medical history, past social history, past surgical  history and problem list.     Past Medical History:   Diagnosis Date   • CAD (coronary artery disease)    • Colon cancer (HCC) 04/13/2016    Colon Cancer Tumors we found on 04/13/2016   • COPD (chronic obstructive pulmonary disease) (HCC)    • Hyperlipidemia    • Hypertension    • Myocardial infarction (HCC)    • PONV (postoperative nausea and vomiting)    • Sleep apnea      Past Surgical History:   Procedure Laterality Date   • BACK SURGERY     • CARDIAC CATHETERIZATION     • CARDIAC CATHETERIZATION Left 6/12/2020    Procedure: Left Heart Cath and coronary angiogram;  Surgeon: Jose Paredes MD;  Location:  [facility] C[facility] INVAS[facility];  Service: Cardiovascular;  Laterality: Left;   • CARDIAC CATHETERIZATION N/A 6/12/2020    Procedure: Left ventriculography;  Surgeon: Jose Paredes MD;  Location:  [facility];  Service: Cardiovascular;  Laterality: N/A;   • COLON SURGERY     • COLONOSCOPY     • CORONARY ANGIOPLASTY      Stent Placement   • ENDOSCOPY     • HYSTERECTOMY     • LUNG SURGERY     • NECK SURGERY     • OTHER SURGICAL HISTORY  12/26/2018    Rm 18 post op right L2-L3 microdiscectomy 12/26/2018     There were no vitals taken for this visit.  Family History   Problem Relation Age of Onset   • Lung disease Mother         Lung/Respiratory Disease   • Diabetes Father    • Heart disease Father    • Heart disease Sister    • Diabetes Sister        Current Outpatient Medications:   •  albuterol sulfate  (90 Base) MCG/ACT inhaler, Inhale 2 puffs Every 4 (Four) Hours As Needed for Wheezing., Disp: , Rfl:   •  ALPRAZolam (XANAX) 1 MG tablet, Take 1 tablet by mouth Daily As Needed., Disp: , Rfl:   •  atorvastatin (LIPITOR) 20 MG tablet, Take 20 mg by mouth Daily., Disp: , Rfl:   •  budesonide (Pulmicort) 0.5 MG/2ML nebulizer solution, Take 2 mL by nebulization Daily. DX J44.9, Disp: 60 each, Rfl: 11  •  dicyclomine (BENTYL) 20 MG tablet, Take 20 mg by mouth Every 6 (Six) Hours., Disp: , Rfl:   •   estradiol (ESTRACE) 1 MG tablet, Take 1 mg by mouth Daily., Disp: , Rfl:   •  furosemide (LASIX) 20 MG tablet, Take 1 tablet by mouth Daily., Disp: , Rfl:   •  HYDROcodone-acetaminophen (NORCO)  MG per tablet, Take 1 tablet by mouth Every 8 (Eight) Hours As Needed., Disp: , Rfl:   •  ipratropium-albuterol (DUO-NEB) 0.5-2.5 mg/3 ml nebulizer, Take 3 mL by nebulization Every 4 (Four) Hours As Needed for Wheezing., Disp: , Rfl:   •  metoprolol tartrate (LOPRESSOR) 25 MG tablet, Take 25 mg by mouth Daily., Disp: , Rfl:   •  mirtazapine (REMERON) 30 MG tablet, Take 30 mg by mouth Every Night., Disp: , Rfl:   •  PARoxetine (PAXIL) 20 MG tablet, Take 20 mg by mouth Every Morning., Disp: , Rfl:   Allergies   Allergen Reactions   • Latex Rash     Social History     Socioeconomic History   • Marital status:    Tobacco Use   • Smoking status: Every Day     Packs/day: 0.50     Types: Cigarettes   • Smokeless tobacco: Never   Vaping Use   • Vaping Use: Never used   Substance and Sexual Activity   • Alcohol use: Never   • Drug use: Never   • Sexual activity: Defer     Review of Systems   Constitutional: Positive for malaise/fatigue. Negative for chills and fever.   HENT: Negative for congestion and nosebleeds.    Cardiovascular: Positive for dyspnea on exertion and palpitations. Negative for chest pain, claudication, leg swelling, near-syncope and syncope.   Respiratory: Positive for shortness of breath. Negative for cough.    Endocrine: Negative.    Hematologic/Lymphatic: Negative.    Gastrointestinal: Negative for abdominal pain, nausea and vomiting.   Neurological: Negative for dizziness, headaches and light-headedness.   Psychiatric/Behavioral: Negative.               Objective:     Vitals reviewed.   Constitutional:       Appearance: Frail. Chronically ill-appearing.   Eyes:      Pupils: Pupils are equal, round, and reactive to light.   HENT:      Nose: Nose normal.    Mouth/Throat:      Pharynx: Oropharynx is  clear.   Pulmonary:      Effort: Pulmonary effort is normal.      Breath sounds: Diffuse and bilateral Wheezing present. No rales.   Cardiovascular:      Normal rate. Regular rhythm.   Pulses:     Intact distal pulses.   Edema:     Peripheral edema absent.   Abdominal:      General: Bowel sounds are normal.   Musculoskeletal: Normal range of motion.      Cervical back: Normal range of motion and neck supple. Skin:     General: Skin is cool.   Neurological:      Mental Status: Alert and oriented to person, place and time.       Procedures    Lab Review:   No diagnosis found.LAB RESULTS (LAST 7 DAYS)        Plan:  Excessive sleeping/fatigue and hand discoloration   Patient states she is experiencing palpitations, fatigue, bilateral hand discoloration  Will obtain echocardiogram to evaluate LV function and valvular abnormalities  Education provided on vascular changes in the presence of long-term smoking  Patient denies pain with color changes but states that time her hands are numb  Application of heat makes her symptoms better    CAD  Patient currently on statin and beta blocker therapy   Previous cardiac catheterization from 2020 30% disease to mid/distal LAD, 30-40% to diagonal branch, 20-30% to LCx, and 40-50% to ostial with spasm noted   Patient has history of MI with stent placment    Hypertension   BP today 121/95  Patient currently on lopressor 25 mg BID    Hyperlipidemia  Patient currently on Lipitor 20 mg daily  Recent lipid panel??    COPD  Patient has albuterol and duo-shana  Patient states she uses her nebulizer every 4 hours while awake  Managed by pulmonology   PFTs from 2020 showed Spirometry: FEV1 0.54 L which is 23% predicted there was minimal response to bronchodilator with increase in FEV1 to  27%  FEV1/FVC ratio 49% predicted, findings suggestive of very severe or end-stage COPD with minimum response bronchodilators there is evidence of air trapping   Patient recently seen by pulmonology and is to  have a CT of chest and pulmonary function testing completed    Current everyday smoker  Education provided on smoking cessation  Patient to have CT scan for routine screening    Patient's previous medical records, labs, and EKG were reviewed and discussed with the patient at today's visit.     Follow-up as needed or in 6 months with Dr. Paredes    Electronically signed by TOMER Lozano, 02/13/23, 9:19 AM EST.

## 2023-02-13 ENCOUNTER — OFFICE VISIT (OUTPATIENT)
Dept: CARDIOLOGY | Facility: CLINIC | Age: 63
End: 2023-02-13
Payer: MEDICARE

## 2023-02-13 VITALS
WEIGHT: 104 LBS | OXYGEN SATURATION: 94 % | DIASTOLIC BLOOD PRESSURE: 75 MMHG | HEART RATE: 91 BPM | SYSTOLIC BLOOD PRESSURE: 121 MMHG | HEIGHT: 63 IN | BODY MASS INDEX: 18.43 KG/M2

## 2023-02-13 DIAGNOSIS — R06.02 SHORTNESS OF BREATH: ICD-10-CM

## 2023-02-13 DIAGNOSIS — I10 PRIMARY HYPERTENSION: ICD-10-CM

## 2023-02-13 DIAGNOSIS — J44.9 CHRONIC OBSTRUCTIVE PULMONARY DISEASE, UNSPECIFIED COPD TYPE: ICD-10-CM

## 2023-02-13 DIAGNOSIS — E78.2 MIXED HYPERLIPIDEMIA: Primary | ICD-10-CM

## 2023-02-13 DIAGNOSIS — I25.10 CORONARY ARTERY DISEASE INVOLVING NATIVE CORONARY ARTERY OF NATIVE HEART WITHOUT ANGINA PECTORIS: ICD-10-CM

## 2023-02-13 PROCEDURE — 99213 OFFICE O/P EST LOW 20 MIN: CPT

## 2023-02-20 ENCOUNTER — HOSPITAL ENCOUNTER (OUTPATIENT)
Dept: RESPIRATORY THERAPY | Facility: HOSPITAL | Age: 63
Discharge: HOME | End: 2023-02-20
Admitting: INTERNAL MEDICINE
Payer: MEDICARE

## 2023-02-20 DIAGNOSIS — J96.11 CHRONIC RESPIRATORY FAILURE WITH HYPOXIA: ICD-10-CM

## 2023-02-20 PROCEDURE — 94729 DIFFUSING CAPACITY: CPT

## 2023-02-20 PROCEDURE — 94060 EVALUATION OF WHEEZING: CPT

## 2023-02-20 PROCEDURE — 63710000001 ALBUTEROL SULFATE HFA 108 (90 BASE) MCG/ACT AEROSOL SOLUTION 6.7 G INHALER: Performed by: INTERNAL MEDICINE

## 2023-02-20 PROCEDURE — A9270 NON-COVERED ITEM OR SERVICE: HCPCS | Performed by: INTERNAL MEDICINE

## 2023-02-20 PROCEDURE — 94727 GAS DIL/WSHOT DETER LNG VOL: CPT

## 2023-02-20 RX ORDER — ALBUTEROL SULFATE 90 UG/1
2 AEROSOL, METERED RESPIRATORY (INHALATION) ONCE
Status: COMPLETED | OUTPATIENT
Start: 2023-02-20 | End: 2023-02-20

## 2023-02-20 RX ADMIN — ALBUTEROL SULFATE 2 PUFF: 108 INHALANT RESPIRATORY (INHALATION) at 13:23

## 2023-03-03 ENCOUNTER — HOSPITAL ENCOUNTER (OUTPATIENT)
Dept: CARDIOLOGY | Facility: HOSPITAL | Age: 63
Discharge: HOME | End: 2023-03-03
Payer: MEDICARE

## 2023-03-03 DIAGNOSIS — R06.02 SHORTNESS OF BREATH: ICD-10-CM

## 2023-03-03 LAB
BH CV ECHO MEAS - ACS: 1.4 CM
BH CV ECHO MEAS - AO MAX PG: 3 MMHG
BH CV ECHO MEAS - AO MEAN PG: 1.67 MMHG
BH CV ECHO MEAS - AO ROOT DIAM: 3 CM
BH CV ECHO MEAS - AO V2 MAX: 87.2 CM/SEC
BH CV ECHO MEAS - AO V2 VTI: 19.3 CM
BH CV ECHO MEAS - AVA(I,D): 1.99 CM2
BH CV ECHO MEAS - EDV(CUBED): 55.7 ML
BH CV ECHO MEAS - EDV(MOD-SP4): 49.3 ML
BH CV ECHO MEAS - EF(MOD-SP4): 49.5 %
BH CV ECHO MEAS - ESV(CUBED): 23.6 ML
BH CV ECHO MEAS - ESV(MOD-SP4): 24.9 ML
BH CV ECHO MEAS - FS: 24.9 %
BH CV ECHO MEAS - IVS/LVPW: 0.74 CM
BH CV ECHO MEAS - IVSD: 0.72 CM
BH CV ECHO MEAS - LV DIASTOLIC VOL/BSA (35-75): 33.7 CM2
BH CV ECHO MEAS - LV MASS(C)D: 93.9 GRAMS
BH CV ECHO MEAS - LV MAX PG: 2.02 MMHG
BH CV ECHO MEAS - LV MEAN PG: 0.98 MMHG
BH CV ECHO MEAS - LV SYSTOLIC VOL/BSA (12-30): 17 CM2
BH CV ECHO MEAS - LV V1 MAX: 71.1 CM/SEC
BH CV ECHO MEAS - LV V1 VTI: 15.9 CM
BH CV ECHO MEAS - LVIDD: 3.8 CM
BH CV ECHO MEAS - LVIDS: 2.9 CM
BH CV ECHO MEAS - LVOT AREA: 2.41 CM2
BH CV ECHO MEAS - LVOT DIAM: 1.75 CM
BH CV ECHO MEAS - LVPWD: 0.98 CM
BH CV ECHO MEAS - MV A MAX VEL: 76.7 CM/SEC
BH CV ECHO MEAS - MV DEC SLOPE: 356.9 CM/SEC2
BH CV ECHO MEAS - MV DEC TIME: 0.2 MSEC
BH CV ECHO MEAS - MV E MAX VEL: 70.7 CM/SEC
BH CV ECHO MEAS - MV E/A: 0.92
BH CV ECHO MEAS - MV MAX PG: 3.1 MMHG
BH CV ECHO MEAS - MV MEAN PG: 1.04 MMHG
BH CV ECHO MEAS - MV V2 VTI: 22.5 CM
BH CV ECHO MEAS - MVA(VTI): 1.7 CM2
BH CV ECHO MEAS - PA ACC TIME: 0.1 SEC
BH CV ECHO MEAS - PA PR(ACCEL): 33.8 MMHG
BH CV ECHO MEAS - PULM A REVS DUR: 0.1 SEC
BH CV ECHO MEAS - PULM A REVS VEL: 33.8 CM/SEC
BH CV ECHO MEAS - PULM DIAS VEL: 74.9 CM/SEC
BH CV ECHO MEAS - PULM S/D: 0.77
BH CV ECHO MEAS - PULM SYS VEL: 57.7 CM/SEC
BH CV ECHO MEAS - RAP SYSTOLE: 8 MMHG
BH CV ECHO MEAS - RV MAX PG: 1.48 MMHG
BH CV ECHO MEAS - RV V1 MAX: 60.8 CM/SEC
BH CV ECHO MEAS - RV V1 VTI: 13.1 CM
BH CV ECHO MEAS - RVDD: 2.26 CM
BH CV ECHO MEAS - RVSP: 37 MMHG
BH CV ECHO MEAS - SI(MOD-SP4): 16.7 ML/M2
BH CV ECHO MEAS - SV(LVOT): 38.3 ML
BH CV ECHO MEAS - SV(MOD-SP4): 24.4 ML
BH CV ECHO MEAS - TR MAX PG: 29 MMHG
BH CV ECHO MEAS - TR MAX VEL: 269.5 CM/SEC
MAXIMAL PREDICTED HEART RATE: 157 BPM
STRESS TARGET HR: 133 BPM

## 2023-03-03 PROCEDURE — 93306 TTE W/DOPPLER COMPLETE: CPT

## 2023-03-03 PROCEDURE — 93306 TTE W/DOPPLER COMPLETE: CPT | Performed by: INTERNAL MEDICINE

## 2023-03-16 ENCOUNTER — HOSPITAL ENCOUNTER (OUTPATIENT)
Dept: CT IMAGING | Facility: HOSPITAL | Age: 63
Discharge: HOME | End: 2023-03-16
Admitting: INTERNAL MEDICINE
Payer: MEDICARE

## 2023-03-16 DIAGNOSIS — J44.9 CHRONIC OBSTRUCTIVE PULMONARY DISEASE, UNSPECIFIED COPD TYPE: ICD-10-CM

## 2023-03-16 DIAGNOSIS — Z72.0 TOBACCO ABUSE: ICD-10-CM

## 2023-03-16 DIAGNOSIS — F17.210 TOBACCO DEPENDENCE DUE TO CIGARETTES: ICD-10-CM

## 2023-03-16 PROCEDURE — 71271 CT THORAX LUNG CANCER SCR C-: CPT

## 2023-03-23 ENCOUNTER — TELEPHONE (OUTPATIENT)
Dept: PULMONOLOGY | Facility: HOSPITAL | Age: 63
End: 2023-03-23
Payer: MEDICARE

## 2023-03-23 NOTE — TELEPHONE ENCOUNTER
Patient's daughter, Ciera called stating that Dr Crockett wrote on an Rx pad for patient to be set up with home pricilla. She spoke with [facility] (in [place]) and they stated that Dr Crockett needs to place a referral for home health to send to them.

## 2023-04-04 DIAGNOSIS — J96.11 CHRONIC HYPOXEMIC RESPIRATORY FAILURE: Primary | ICD-10-CM

## 2023-04-05 ENCOUNTER — HOME HEALTH ADMISSION (OUTPATIENT)
Dept: HOME HEALTH SERVICES | Facility: HOME HEALTHCARE | Age: 63
End: 2023-04-05
Payer: MEDICARE

## 2023-04-05 NOTE — TELEPHONE ENCOUNTER
[facility] does not accept pts [employer] insurance.   Forwarded order to [facility] health.

## 2023-08-10 ENCOUNTER — OFFICE VISIT (OUTPATIENT)
Dept: PULMONOLOGY | Facility: HOSPITAL | Age: 63
End: 2023-08-10
Payer: MEDICARE

## 2023-08-10 VITALS
SYSTOLIC BLOOD PRESSURE: 144 MMHG | DIASTOLIC BLOOD PRESSURE: 86 MMHG | HEART RATE: 72 BPM | BODY MASS INDEX: 17.19 KG/M2 | WEIGHT: 97 LBS | RESPIRATION RATE: 16 BRPM | OXYGEN SATURATION: 94 % | HEIGHT: 63 IN

## 2023-08-10 DIAGNOSIS — Z72.0 TOBACCO ABUSE: ICD-10-CM

## 2023-08-10 DIAGNOSIS — J43.1 PANLOBULAR EMPHYSEMA: ICD-10-CM

## 2023-08-10 DIAGNOSIS — E43 SEVERE MALNUTRITION: ICD-10-CM

## 2023-08-10 DIAGNOSIS — J96.11 CHRONIC RESPIRATORY FAILURE WITH HYPOXIA AND HYPERCAPNIA: Primary | ICD-10-CM

## 2023-08-10 DIAGNOSIS — J96.12 CHRONIC RESPIRATORY FAILURE WITH HYPOXIA AND HYPERCAPNIA: Primary | ICD-10-CM

## 2023-08-10 NOTE — PROGRESS NOTES
SLEEP/PULMONARY  CLINIC NOTE      PATIENT IDENTIFICATION:  Name: Jocelin Wills  Age: 63 y.o.  Sex: female  :  1960  MRN: [id number]    DATE OF CONSULTATION:  8/10/2023                     CHIEF COMPLAINT: Follow-up on chronic respiratory failure    History of Present Illness:   Jocelin Wills is a 63 y.o. female chronic respiratory failure on home oxygen, and trilogy machine, chronic obstructive airway disease, still smoking, still having dyspnea exertion, coughing no hemoptysis no fever no chills no dizziness no lightheadedness no significant change in his weight recently  Last hospitalization was 2 years ago    Review of Systems:   Constitutional: As above   Eyes: negative   ENT/oropharynx: negative   Cardiovascular: negative   Respiratory: As above   Gastrointestinal: negative   Genitourinary: negative   Neurological: negative   Musculoskeletal: negative   Integument/breast: negative   Endocrine: negative   Allergic/Immunologic: negative     Past Medical History:  Past Medical History:   Diagnosis Date    CAD (coronary artery disease)     Colon cancer 2016    Colon Cancer Tumors we found on 2016    COPD (chronic obstructive pulmonary disease)     Hyperlipidemia     Hypertension     Myocardial infarction     PONV (postoperative nausea and vomiting)     Sleep apnea        Past Surgical History:  Past Surgical History:   Procedure Laterality Date    BACK SURGERY      CARDIAC CATHETERIZATION      CARDIAC CATHETERIZATION Left 2020    Procedure: Left Heart Cath and coronary angiogram;  Surgeon: Jose Paredes MD;  Location: [facility] CATH INVASIVE LOCATION;  Service: Cardiovascular;  Laterality: Left;    CARDIAC CATHETERIZATION N/A 2020    Procedure: Left ventriculography;  Surgeon: Jose Paredes MD;  Location: [facility] CATH INVASIVE LOCATION;  Service: Cardiovascular;  Laterality: N/A;    COLON SURGERY      COLONOSCOPY      CORONARY ANGIOPLASTY      Stent Placement    ENDOSCOPY       HYSTERECTOMY      LUNG SURGERY      NECK SURGERY      OTHER SURGICAL HISTORY  12/26/2018    Rm 18 post op right L2-L3 microdiscectomy 12/26/2018        Family History:  Family History   Problem Relation Age of Onset    Lung disease Mother         Lung/Respiratory Disease    Diabetes Father     Heart disease Father     Heart disease Sister     Diabetes Sister         Social History:   Social History     Tobacco Use    Smoking status: Every Day     Packs/day: 0.50     Types: Cigarettes     Passive exposure: Past    Smokeless tobacco: Never   Substance Use Topics    Alcohol use: Never        Allergies:  Allergies   Allergen Reactions    Latex Rash       Home Meds:  (Not in a hospital admission)      Objective:    Vitals Ranges:   Heart Rate:  [72] 72  Resp:  [16] 16  BP: (144)/(86) 144/86  Body mass index is 17.18 kg/mý.     Exam:  General Appearance:  WDWN    HEENT:   without obvious abnormality,  Conjunctiva/corneas clear,  Normal external ear canals, no drainage    Clear orsalmucosa,  Mallampati score 3    Neck:  Supple, symmetrical, trachea midline. No JVD.  Lungs:   Bilateral basal rhonchi bilaterally, respirations unlabored symmetrical wall movement.    Chest wall:  No tenderness or deformity.    Heart:  Regular rate and rhythm, S1 and S2 normal.  Extremities: Trace edema no clubbing or Cyanosis        Data Review:  All labs (24hrs): No results found for this or any previous visit (from the past 24 hour(s)).     Imaging:  CT Chest Low Dose Cancer Screening WO  Narrative: CT CHEST LOW DOSE CANCER SCREENING WO    Date of Exam: 3/16/2023 1:12 PM EDT    Indication: Lung cancer screening, >= 20 pk-yr smoking history (Age >= 50y).    Comparison: January 5, 2022    Technique: Low dose CT imaging of the chest was performed without intravenous contrast enhancement.  Automated exposure control and iterative reconstruction methods were used.     Findings:    The central tracheobronchial tree is clear. There is mild  centrilobular emphysema. No pulmonary nodule is identified. There are changes from right lower lobe wedge resection. There is no focal consolidation. There is no pleural effusion.    The heart size appears normal, with evidence of calcified coronary artery disease. The great vessels are normal in caliber. No abnormally enlarged lymph nodes are identified.    Partial evaluation of the upper abdomen demonstrates changes of cholecystectomy.    No aggressive osseous lesions are identified.  Impression: Impression:   1.No evidence of lung cancer.  2.No acute cardiopulmonary process.    Recommendation:   Continue annual screening with LDCT    Lung Rads Assessment:   Lung-RADS L1 - Negative, <1% chance of malignancy.     Electronically Signed: Coleman Callahan    3/16/2023 7:31 PM EDT    Workstation ID: [id number]       ASSESSMENT:  Diagnoses and all orders for this visit:    Chronic respiratory failure with hypoxia and hypercapnia    Panlobular emphysema    Tobacco abuse    Severe malnutrition    Other orders  -     SCANNED - PULMONARY RESULTS        PLAN:  Continue with the current oxygen supplement    Bronchodilator inhaled corticosteroid    Education how to use inhalers    Encouraged to use incentive spirometer    Continue to exercise slowly as tolerated    Monitor for any change in the color of the sputum    Avoid any exposure to fumes, gas or any irritant        This patient with obstructive sleep apnea, compliance is improved. Encourage to use it more frequent, I re-emphasized on pt the long and short term benefit of treating THALIA. The device is benefiting the patient.  The patient is also instructed to get the CPAP supplies from the [facility] and see me in 1 year for follow-up.    Education patient  About tips for  smoking cessation and risk factors and benefit, was to a discussion with the patient.     Follow-up 6 months    Pacheco Crockett MD. D, ABSM.  8/10/2023  15:41 EDT

## 2023-08-15 ENCOUNTER — OFFICE VISIT (OUTPATIENT)
Dept: CARDIOLOGY | Facility: CLINIC | Age: 63
End: 2023-08-15
Payer: MEDICARE

## 2023-08-15 VITALS
WEIGHT: 97 LBS | BODY MASS INDEX: 17.19 KG/M2 | SYSTOLIC BLOOD PRESSURE: 134 MMHG | HEART RATE: 67 BPM | OXYGEN SATURATION: 98 % | DIASTOLIC BLOOD PRESSURE: 81 MMHG | HEIGHT: 63 IN

## 2023-08-15 DIAGNOSIS — I10 PRIMARY HYPERTENSION: Primary | ICD-10-CM

## 2023-08-15 DIAGNOSIS — E78.2 MIXED HYPERLIPIDEMIA: ICD-10-CM

## 2023-08-15 DIAGNOSIS — J44.9 CHRONIC OBSTRUCTIVE PULMONARY DISEASE, UNSPECIFIED COPD TYPE: ICD-10-CM

## 2023-08-15 DIAGNOSIS — I25.10 CORONARY ARTERY DISEASE INVOLVING NATIVE CORONARY ARTERY OF NATIVE HEART WITHOUT ANGINA PECTORIS: ICD-10-CM

## 2023-08-15 PROCEDURE — 1159F MED LIST DOCD IN RCRD: CPT | Performed by: INTERNAL MEDICINE

## 2023-08-15 PROCEDURE — 1160F RVW MEDS BY RX/DR IN RCRD: CPT | Performed by: INTERNAL MEDICINE

## 2023-08-15 PROCEDURE — 3079F DIAST BP 80-89 MM HG: CPT | Performed by: INTERNAL MEDICINE

## 2023-08-15 PROCEDURE — 3075F SYST BP GE 130 - 139MM HG: CPT | Performed by: INTERNAL MEDICINE

## 2023-08-15 PROCEDURE — 99214 OFFICE O/P EST MOD 30 MIN: CPT | Performed by: INTERNAL MEDICINE

## 2023-08-15 NOTE — PROGRESS NOTES
Subjective:     Encounter Date:08/15/2023      Patient ID: Jocelin Wills is a 63 y.o. female.    Chief Complaint:  History of Present Illness 63-year-old white female with history of coronary disease hypertension hyperlipidemia and COPD presents to my office for a follow-up.  Patient is currently stable without any symptoms of chest pain but has shortness of breath with exertion but no complains any PND orthopnea.  She has occasional palpitation with dizziness.  No syncope or swelling of the feet.  Patient is taking all her medicines regularly.  Patient continues to smoke.  She also uses oxygen all the time.    The following portions of the patient's history were reviewed and updated as appropriate: allergies, current medications, past family history, past medical history, past social history, past surgical history, and problem list.  Past Medical History:   Diagnosis Date    CAD (coronary artery disease)     Colon cancer 04/13/2016    Colon Cancer Tumors we found on 04/13/2016    COPD (chronic obstructive pulmonary disease)     Hyperlipidemia     Hypertension     Myocardial infarction     PONV (postoperative nausea and vomiting)     Sleep apnea      Past Surgical History:   Procedure Laterality Date    BACK SURGERY      CARDIAC CATHETERIZATION      CARDIAC CATHETERIZATION Left 6/12/2020    Procedure: Left Heart Cath and coronary angiogram;  Surgeon: Jose Paredes MD;  Location: [facility] CATH INVASIVE LOCATION;  Service: Cardiovascular;  Laterality: Left;    CARDIAC CATHETERIZATION N/A 6/12/2020    Procedure: Left ventriculography;  Surgeon: Jose Paredes MD;  Location: [facility] CATH INVASIVE LOCATION;  Service: Cardiovascular;  Laterality: N/A;    COLON SURGERY      COLONOSCOPY      CORONARY ANGIOPLASTY      Stent Placement    ENDOSCOPY      HYSTERECTOMY      LUNG SURGERY      NECK SURGERY      OTHER SURGICAL HISTORY  12/26/2018    Rm 18 post op right L2-L3 microdiscectomy 12/26/2018     /81   Pulse 67   " Ht 160 cm (63\")   Wt 44 kg (97 lb)   SpO2 98%   BMI 17.18 kg/mý   Family History   Problem Relation Age of Onset    Lung disease Mother         Lung/Respiratory Disease    Diabetes Father     Heart disease Father     Heart disease Sister     Diabetes Sister        Current Outpatient Medications:     albuterol sulfate  (90 Base) MCG/ACT inhaler, Inhale 2 puffs Every 4 (Four) Hours As Needed for Wheezing., Disp: , Rfl:     ALPRAZolam (XANAX) 1 MG tablet, Take 1 tablet by mouth Daily As Needed., Disp: , Rfl:     atorvastatin (LIPITOR) 20 MG tablet, Take 1 tablet by mouth Daily., Disp: , Rfl:     budesonide (Pulmicort) 0.5 MG/2ML nebulizer solution, Take 2 mL by nebulization Daily. DX J44.9, Disp: 60 each, Rfl: 11    dicyclomine (BENTYL) 20 MG tablet, Take 1 tablet by mouth Every 6 (Six) Hours., Disp: , Rfl:     furosemide (LASIX) 20 MG tablet, Take 1 tablet by mouth Daily., Disp: , Rfl:     HYDROcodone-acetaminophen (NORCO)  MG per tablet, Take 1 tablet by mouth Every 8 (Eight) Hours As Needed., Disp: , Rfl:     ipratropium-albuterol (DUO-NEB) 0.5-2.5 mg/3 ml nebulizer, Take 3 mL by nebulization Every 4 (Four) Hours As Needed for Wheezing., Disp: , Rfl:     metoprolol tartrate (LOPRESSOR) 25 MG tablet, Take 1 tablet by mouth Daily., Disp: , Rfl:     mirtazapine (REMERON) 30 MG tablet, Take 1 tablet by mouth Every Night., Disp: , Rfl:     PARoxetine (PAXIL) 20 MG tablet, Take 1 tablet by mouth Every Morning., Disp: , Rfl:   Allergies   Allergen Reactions    Latex Rash     Social History     Socioeconomic History    Marital status:    Tobacco Use    Smoking status: Every Day     Packs/day: 0.50     Types: Cigarettes     Passive exposure: Past    Smokeless tobacco: Never   Vaping Use    Vaping Use: Never used   Substance and Sexual Activity    Alcohol use: Never    Drug use: Never    Sexual activity: Defer     Review of Systems   Constitutional: Positive for malaise/fatigue.   Cardiovascular:  " Positive for palpitations. Negative for chest pain, dyspnea on exertion and leg swelling.   Respiratory:  Positive for shortness of breath. Negative for cough.    Gastrointestinal:  Negative for abdominal pain, nausea and vomiting.   Neurological:  Positive for dizziness and light-headedness. Negative for focal weakness, headaches and numbness.   All other systems reviewed and are negative.           Objective:     Constitutional:       Appearance: Well-developed.   Eyes:      General: No scleral icterus.     Conjunctiva/sclera: Conjunctivae normal.   HENT:      Head: Normocephalic and atraumatic.   Neck:      Vascular: No carotid bruit or JVD.   Pulmonary:      Effort: Pulmonary effort is normal.      Breath sounds: Wheezing present. Rhonchi present. No rales.   Cardiovascular:      Normal rate. Regular rhythm.   Pulses:     Intact distal pulses.   Abdominal:      General: Bowel sounds are normal.      Palpations: Abdomen is soft.   Musculoskeletal:      Cervical back: Normal range of motion and neck supple. Skin:     General: Skin is warm and dry.      Findings: No rash.   Neurological:      Mental Status: Alert.     Procedures    Lab Review:         MDM    #1 coronary disease  Patient has nonobstructive disease in the past with normal LV function is currently stable on medications with normal function  2.  Hypertension  Patient blood pressure currently stable on metoprolol  3.  Hyperlipidemia  Patient is on atorvastatin the lipid levels are well within normal limits  4.  COPD  Patient has history of COPD and still continues to smoke and is using oxygen.    Patient's previous medical records, labs, and EKG were reviewed and discussed with the patient at today's visit.

## 2024-02-12 ENCOUNTER — OFFICE VISIT (OUTPATIENT)
Dept: PULMONOLOGY | Facility: HOSPITAL | Age: 64
End: 2024-02-12
Payer: MEDICARE

## 2024-02-12 VITALS
RESPIRATION RATE: 14 BRPM | WEIGHT: 103.4 LBS | HEART RATE: 67 BPM | SYSTOLIC BLOOD PRESSURE: 137 MMHG | DIASTOLIC BLOOD PRESSURE: 84 MMHG | OXYGEN SATURATION: 100 % | BODY MASS INDEX: 18.32 KG/M2 | HEIGHT: 63 IN

## 2024-02-12 DIAGNOSIS — Z72.0 TOBACCO ABUSE: ICD-10-CM

## 2024-02-12 DIAGNOSIS — J96.11 CHRONIC RESPIRATORY FAILURE WITH HYPOXIA AND HYPERCAPNIA: Primary | ICD-10-CM

## 2024-02-12 DIAGNOSIS — F17.210 TOBACCO DEPENDENCE DUE TO CIGARETTES: ICD-10-CM

## 2024-02-12 DIAGNOSIS — J43.1 PANLOBULAR EMPHYSEMA: ICD-10-CM

## 2024-02-12 DIAGNOSIS — J96.12 CHRONIC RESPIRATORY FAILURE WITH HYPOXIA AND HYPERCAPNIA: Primary | ICD-10-CM

## 2024-02-12 PROCEDURE — G0463 HOSPITAL OUTPT CLINIC VISIT: HCPCS

## 2024-02-12 RX ORDER — NICOTINE 21 MG/24HR
1 PATCH, TRANSDERMAL 24 HOURS TRANSDERMAL EVERY 24 HOURS
COMMUNITY
Start: 2023-12-22

## 2024-02-12 RX ORDER — MIRTAZAPINE 45 MG/1
45 TABLET, FILM COATED ORAL
COMMUNITY

## 2024-02-12 RX ORDER — PAROXETINE HYDROCHLORIDE 40 MG/1
40 TABLET, FILM COATED ORAL EVERY MORNING
COMMUNITY
Start: 2023-12-22

## 2024-02-12 RX ORDER — NICOTINE 21 MG/24HR
1 PATCH, TRANSDERMAL 24 HOURS TRANSDERMAL EVERY 24 HOURS
Qty: 30 EACH | Refills: 3 | Status: SHIPPED | OUTPATIENT
Start: 2024-02-12

## 2024-02-12 RX ORDER — OMEPRAZOLE 40 MG/1
1 CAPSULE, DELAYED RELEASE ORAL DAILY
COMMUNITY
Start: 2023-12-22

## 2024-02-12 RX ORDER — AZELASTINE HYDROCHLORIDE, FLUTICASONE PROPIONATE 137; 50 UG/1; UG/1
1 SPRAY, METERED NASAL EVERY 12 HOURS SCHEDULED
COMMUNITY

## 2024-03-28 ENCOUNTER — HOSPITAL ENCOUNTER (OUTPATIENT)
Dept: CT IMAGING | Facility: HOSPITAL | Age: 64
Discharge: HOME | End: 2024-03-28
Admitting: INTERNAL MEDICINE
Payer: MEDICARE

## 2024-03-28 DIAGNOSIS — J96.12 CHRONIC RESPIRATORY FAILURE WITH HYPOXIA AND HYPERCAPNIA: ICD-10-CM

## 2024-03-28 DIAGNOSIS — Z72.0 TOBACCO ABUSE: ICD-10-CM

## 2024-03-28 DIAGNOSIS — F17.210 TOBACCO DEPENDENCE DUE TO CIGARETTES: ICD-10-CM

## 2024-03-28 DIAGNOSIS — J96.11 CHRONIC RESPIRATORY FAILURE WITH HYPOXIA AND HYPERCAPNIA: ICD-10-CM

## 2024-03-28 PROCEDURE — 71271 CT THORAX LUNG CANCER SCR C-: CPT

## 2024-07-02 ENCOUNTER — TRANSCRIBE ORDERS (OUTPATIENT)
Dept: ADMINISTRATIVE | Facility: HOSPITAL | Age: 64
End: 2024-07-02
Payer: MEDICARE

## 2024-07-02 DIAGNOSIS — R60.0 LOWER EXTREMITY EDEMA: Primary | ICD-10-CM

## 2024-07-31 ENCOUNTER — HOSPITAL ENCOUNTER (OUTPATIENT)
Dept: CARDIOLOGY | Facility: HOSPITAL | Age: 64
Discharge: HOME | End: 2024-07-31
Admitting: INTERNAL MEDICINE
Payer: MEDICARE

## 2024-07-31 VITALS
DIASTOLIC BLOOD PRESSURE: 84 MMHG | SYSTOLIC BLOOD PRESSURE: 137 MMHG | HEIGHT: 63 IN | WEIGHT: 103 LBS | BODY MASS INDEX: 18.25 KG/M2

## 2024-07-31 DIAGNOSIS — R60.0 LOWER EXTREMITY EDEMA: ICD-10-CM

## 2024-07-31 LAB
AORTIC DIMENSIONLESS INDEX: 0.77 (DI)
BH CV ECHO LEFT VENTRICLE GLOBAL LONGITUDINAL STRAIN: -14 %
BH CV ECHO MEAS - ACS: 1.7 CM
BH CV ECHO MEAS - AO MAX PG: 4.9 MMHG
BH CV ECHO MEAS - AO MEAN PG: 3 MMHG
BH CV ECHO MEAS - AO V2 MAX: 111 CM/SEC
BH CV ECHO MEAS - AO V2 VTI: 22.5 CM
BH CV ECHO MEAS - AVA(I,D): 1.91 CM2
BH CV ECHO MEAS - EDV(CUBED): 64 ML
BH CV ECHO MEAS - EDV(MOD-SP4): 49.4 ML
BH CV ECHO MEAS - EF(MOD-BP): 50 %
BH CV ECHO MEAS - EF(MOD-SP4): 50 %
BH CV ECHO MEAS - ESV(CUBED): 27 ML
BH CV ECHO MEAS - ESV(MOD-SP4): 24.7 ML
BH CV ECHO MEAS - FS: 25 %
BH CV ECHO MEAS - IVS/LVPW: 0.89 CM
BH CV ECHO MEAS - IVSD: 0.8 CM
BH CV ECHO MEAS - LA DIMENSION: 2.9 CM
BH CV ECHO MEAS - LAT PEAK E' VEL: 10.9 CM/SEC
BH CV ECHO MEAS - LV DIASTOLIC VOL/BSA (35-75): 33.9 CM2
BH CV ECHO MEAS - LV MASS(C)D: 101.4 GRAMS
BH CV ECHO MEAS - LV MAX PG: 2.9 MMHG
BH CV ECHO MEAS - LV MEAN PG: 1 MMHG
BH CV ECHO MEAS - LV SYSTOLIC VOL/BSA (12-30): 16.9 CM2
BH CV ECHO MEAS - LV V1 MAX: 85.5 CM/SEC
BH CV ECHO MEAS - LV V1 VTI: 16.9 CM
BH CV ECHO MEAS - LVIDD: 4 CM
BH CV ECHO MEAS - LVIDS: 3 CM
BH CV ECHO MEAS - LVOT AREA: 2.5 CM2
BH CV ECHO MEAS - LVOT DIAM: 1.8 CM
BH CV ECHO MEAS - LVPWD: 0.9 CM
BH CV ECHO MEAS - MED PEAK E' VEL: 7.7 CM/SEC
BH CV ECHO MEAS - MR MAX PG: 20.6 MMHG
BH CV ECHO MEAS - MR MAX VEL: 227 CM/SEC
BH CV ECHO MEAS - MV A MAX VEL: 77.6 CM/SEC
BH CV ECHO MEAS - MV DEC SLOPE: 237 CM/SEC2
BH CV ECHO MEAS - MV DEC TIME: 0.23 SEC
BH CV ECHO MEAS - MV E MAX VEL: 62.3 CM/SEC
BH CV ECHO MEAS - MV E/A: 0.8
BH CV ECHO MEAS - MV MAX PG: 2.6 MMHG
BH CV ECHO MEAS - MV MEAN PG: 1 MMHG
BH CV ECHO MEAS - MV P1/2T: 81.1 MSEC
BH CV ECHO MEAS - MV V2 VTI: 22.8 CM
BH CV ECHO MEAS - MVA(P1/2T): 2.7 CM2
BH CV ECHO MEAS - MVA(VTI): 1.89 CM2
BH CV ECHO MEAS - PA ACC TIME: 0.12 SEC
BH CV ECHO MEAS - PA V2 MAX: 88.6 CM/SEC
BH CV ECHO MEAS - RAP SYSTOLE: 3 MMHG
BH CV ECHO MEAS - RV MAX PG: 2.7 MMHG
BH CV ECHO MEAS - RV V1 MAX: 82.2 CM/SEC
BH CV ECHO MEAS - RV V1 VTI: 15.4 CM
BH CV ECHO MEAS - RVSP: 27.8 MMHG
BH CV ECHO MEAS - SV(LVOT): 43 ML
BH CV ECHO MEAS - SV(MOD-SP4): 24.7 ML
BH CV ECHO MEAS - SVI(LVOT): 29.5 ML/M2
BH CV ECHO MEAS - SVI(MOD-SP4): 16.9 ML/M2
BH CV ECHO MEAS - TAPSE (>1.6): 1.54 CM
BH CV ECHO MEAS - TR MAX PG: 24.8 MMHG
BH CV ECHO MEAS - TR MAX VEL: 249 CM/SEC
BH CV ECHO MEAS RV FREE WALL STRAIN: -9.5 %
BH CV ECHO MEASUREMENTS AVERAGE E/E' RATIO: 6.7
BH CV XLRA - TDI S': 10.8 CM/SEC
LEFT ATRIUM VOLUME INDEX: 15.1 ML/M2
SINUS: 2.8 CM

## 2024-07-31 PROCEDURE — 93306 TTE W/DOPPLER COMPLETE: CPT

## 2024-07-31 PROCEDURE — 93356 MYOCRD STRAIN IMG SPCKL TRCK: CPT

## 2025-07-25 ENCOUNTER — TRANSCRIBE ORDERS (OUTPATIENT)
Dept: ADMINISTRATIVE | Facility: HOSPITAL | Age: 65
End: 2025-07-25
Payer: MEDICAID

## 2025-07-25 DIAGNOSIS — J44.9 CHRONIC OBSTRUCTIVE PULMONARY DISEASE, UNSPECIFIED COPD TYPE: ICD-10-CM

## 2025-07-25 DIAGNOSIS — Z99.81 OXYGEN DEPENDENT: ICD-10-CM

## 2025-07-28 ENCOUNTER — TELEPHONE (OUTPATIENT)
Dept: CARDIOLOGY | Facility: CLINIC | Age: 65
End: 2025-07-28
Payer: MEDICAID

## 2025-07-28 NOTE — TELEPHONE ENCOUNTER
Caller: Jocelin Wills    Relationship to patient: Self    Best call back number: [phone]     Chief complaint: SOB, ONLY WHEN SHE'S UP DOING STUFF. LAST OV WAS IN 2023    Type of visit: FU    Requested date: FIRST AVAIL, NOT EARLY IN AM THOUGH.      If rescheduling, when is the original appointment:      Additional notes:

## 2025-07-30 ENCOUNTER — OFFICE VISIT (OUTPATIENT)
Dept: CARDIOLOGY | Facility: CLINIC | Age: 65
End: 2025-07-30
Payer: MEDICARE

## 2025-07-30 VITALS
DIASTOLIC BLOOD PRESSURE: 88 MMHG | SYSTOLIC BLOOD PRESSURE: 141 MMHG | WEIGHT: 147 LBS | HEIGHT: 63 IN | BODY MASS INDEX: 26.05 KG/M2 | HEART RATE: 69 BPM | OXYGEN SATURATION: 96 %

## 2025-07-30 DIAGNOSIS — I25.10 CORONARY ARTERY DISEASE INVOLVING NATIVE CORONARY ARTERY OF NATIVE HEART WITHOUT ANGINA PECTORIS: Primary | ICD-10-CM

## 2025-07-30 DIAGNOSIS — E78.2 MIXED HYPERLIPIDEMIA: ICD-10-CM

## 2025-07-30 DIAGNOSIS — I10 PRIMARY HYPERTENSION: ICD-10-CM

## 2025-07-30 DIAGNOSIS — J44.9 CHRONIC OBSTRUCTIVE PULMONARY DISEASE, UNSPECIFIED COPD TYPE: ICD-10-CM

## 2025-07-30 PROCEDURE — 3077F SYST BP >= 140 MM HG: CPT | Performed by: INTERNAL MEDICINE

## 2025-07-30 PROCEDURE — 99214 OFFICE O/P EST MOD 30 MIN: CPT | Performed by: INTERNAL MEDICINE

## 2025-07-30 PROCEDURE — 1159F MED LIST DOCD IN RCRD: CPT | Performed by: INTERNAL MEDICINE

## 2025-07-30 PROCEDURE — 1160F RVW MEDS BY RX/DR IN RCRD: CPT | Performed by: INTERNAL MEDICINE

## 2025-07-30 PROCEDURE — 3079F DIAST BP 80-89 MM HG: CPT | Performed by: INTERNAL MEDICINE

## 2025-07-30 RX ORDER — DOXYCYCLINE 100 MG/1
100 TABLET ORAL EVERY 12 HOURS SCHEDULED
COMMUNITY
Start: 2025-07-22

## 2025-07-30 RX ORDER — ASPIRIN 81 MG/1
81 TABLET, CHEWABLE ORAL DAILY
COMMUNITY

## 2025-07-30 RX ORDER — CARVEDILOL 12.5 MG/1
12.5 TABLET ORAL 2 TIMES DAILY
COMMUNITY

## 2025-07-30 RX ORDER — FORMOTEROL FUMARATE DIHYDRATE 20 UG/2ML
20 SOLUTION RESPIRATORY (INHALATION) EVERY 12 HOURS SCHEDULED
COMMUNITY
Start: 2025-07-22

## 2025-07-30 NOTE — PROGRESS NOTES
Subjective:     Encounter Date:07/30/2025      Patient ID: Jocelin Wills is a 65 y.o. female.    Chief Complaint:  Shortness of Breath  Associated symptoms: no chest pain, no headaches, no leg swelling and no vomiting      Hyperlipidemia and COPD presents to the office for a follow-up.  Patient does not have any symptoms of chest pain but has shortness of breath with exertion.  No complaints of PND orthopnea.  No palpitation dizziness syncope or swelling of the feet.  She is taking her medicines regularly.  She does not smoke but uses oxygen all the time.  The following portions of the patient's history were reviewed and updated as appropriate: allergies, current medications, past family history, past medical history, past social history, past surgical history, and problem list.  Past Medical History:   Diagnosis Date    Asthma     CAD (coronary artery disease)     Chronic bronchitis Child    Colon cancer 04/13/2016    Colon Cancer Tumors we found on 04/13/2016    COPD (chronic obstructive pulmonary disease)     Emphysema of lung 2020    Hyperlipidemia     Hypertension     Lung nodule 20 yrs    Removed large granuloma out of right lung    Myocardial infarction     PONV (postoperative nausea and vomiting)     Sinusitis     Sleep apnea     Sleep apnea, obstructive      Past Surgical History:   Procedure Laterality Date    BACK SURGERY      CARDIAC CATHETERIZATION      CARDIAC CATHETERIZATION Left 06/12/2020    Procedure: Left Heart Cath and coronary angiogram;  Surgeon: Jose Paredes MD;  Location: [facility] CATH INVASIVE LOCATION;  Service: Cardiovascular;  Laterality: Left;    CARDIAC CATHETERIZATION N/A 06/12/2020    Procedure: Left ventriculography;  Surgeon: Jose Paredes MD;  Location: [facility] CATH INVASIVE LOCATION;  Service: Cardiovascular;  Laterality: N/A;    COLON SURGERY      COLONOSCOPY      CORONARY ANGIOPLASTY      Stent Placement    CORONARY STENT PLACEMENT      ENDOSCOPY      HYSTERECTOMY       "LUNG BIOPSY  2005    No cancer    LUNG SURGERY      NECK SURGERY      OTHER SURGICAL HISTORY  12/26/2018    Rm 18 post op right L2-L3 microdiscectomy 12/26/2018     /88 (BP Location: Right arm, Patient Position: Sitting, Cuff Size: Adult)   Pulse 69   Ht 160 cm (63\")   Wt 66.7 kg (147 lb)   SpO2 96% Comment: 3 L O2  BMI 26.04 kg/m²   Family History   Problem Relation Age of Onset    Lung disease Mother         Lung/Respiratory Disease    Emphysema Mother     Diabetes Father     Heart disease Father     Heart failure Father     Hypertension Father     Heart disease Sister     Diabetes Sister     Asthma Paternal Uncle        Current Outpatient Medications:     aspirin 81 MG chewable tablet, Chew 1 tablet Daily., Disp: , Rfl:     atorvastatin (LIPITOR) 20 MG tablet, Take 1 tablet by mouth Daily., Disp: , Rfl:     budesonide (Pulmicort) 0.5 MG/2ML nebulizer solution, Take 2 mL by nebulization Daily. DX J44.9, Disp: 60 each, Rfl: 11    carvedilol (COREG) 12.5 MG tablet, Take 1 tablet by mouth 2 (Two) Times a Day., Disp: , Rfl:     doxycycline (ADOXA) 100 MG tablet, Take 1 tablet by mouth Every 12 (Twelve) Hours., Disp: , Rfl:     furosemide (LASIX) 20 MG tablet, Take 1 tablet by mouth Daily., Disp: , Rfl:     ipratropium-albuterol (DUO-NEB) 0.5-2.5 mg/3 ml nebulizer, Take 3 mL by nebulization Every 4 (Four) Hours As Needed for Wheezing., Disp: , Rfl:     mirtazapine (REMERON) 45 MG tablet, Take 1 tablet by mouth every night at bedtime., Disp: , Rfl:     omeprazole (priLOSEC) 40 MG capsule, Take 1 capsule by mouth Daily., Disp: , Rfl:     PARoxetine (PAXIL) 40 MG tablet, Take 1 tablet by mouth Every Morning., Disp: , Rfl:     Perforomist 20 MCG/2ML nebulizer solution, Take 2 mL by nebulization Every 12 (Twelve) Hours., Disp: , Rfl:     albuterol sulfate  (90 Base) MCG/ACT inhaler, Inhale 2 puffs Every 4 (Four) Hours As Needed for Wheezing. (Patient not taking: Reported on 7/30/2025), Disp: , Rfl:     " Azelastine-Fluticasone 137-50 MCG/ACT suspension, Inhale 1 spray Every 12 (Twelve) Hours. (Patient not taking: Reported on 2025), Disp: , Rfl:     dicyclomine (BENTYL) 20 MG tablet, Take 1 tablet by mouth Every 6 (Six) Hours. (Patient not taking: Reported on 2025), Disp: , Rfl:     Fluticasone-Umeclidin-Vilant 200-62.5-25 MCG/ACT aerosol powder , Inhale 1 puff Daily. (Patient not taking: Reported on 2025), Disp: , Rfl:     metoprolol tartrate (LOPRESSOR) 25 MG tablet, Take 1 tablet by mouth Daily. (Patient not taking: Reported on 2025), Disp: , Rfl:     nicotine (NICODERM CQ) 14 MG/24HR patch, Place 1 patch on the skin as directed by provider Daily. (Patient not taking: Reported on 2025), Disp: 30 each, Rfl: 3    nicotine (NICODERM CQ) 21 MG/24HR patch, Place 1 patch on the skin as directed by provider Daily. (Patient not taking: Reported on 2025), Disp: , Rfl:   Allergies   Allergen Reactions    Latex Rash     Social History     Socioeconomic History    Marital status:    Tobacco Use    Smoking status: Former     Current packs/day: 0.00     Average packs/day: 1 pack/day for 29.2 years (29.2 ttl pk-yrs)     Types: Cigarettes     Start date: 10/21/1994     Quit date: 2024     Years since quittin.5     Passive exposure: Past    Smokeless tobacco: Never   Vaping Use    Vaping status: Never Used   Substance and Sexual Activity    Alcohol use: Never    Drug use: Never    Sexual activity: Not Currently     Partners: Male     Birth control/protection: Abstinence, Hysterectomy     Review of Systems   Constitutional: Negative for malaise/fatigue.   Cardiovascular:  Positive for dyspnea on exertion and palpitations. Negative for chest pain and leg swelling.   Respiratory:  Negative for cough and shortness of breath.    Gastrointestinal:  Negative for abdominal pain, nausea and vomiting.   Neurological:  Negative for dizziness, headaches, light-headedness, numbness and weakness.    All other systems reviewed and are negative.             Objective:     Constitutional:       Appearance: Well-developed.   Eyes:      General: No scleral icterus.     Conjunctiva/sclera: Conjunctivae normal.   HENT:      Head: Normocephalic and atraumatic.   Neck:      Vascular: No carotid bruit or JVD.   Pulmonary:      Effort: Pulmonary effort is normal.      Breath sounds: Normal breath sounds. No wheezing. No rales.   Cardiovascular:      Normal rate. Regular rhythm.   Pulses:     Intact distal pulses.   Abdominal:      General: Bowel sounds are normal.      Palpations: Abdomen is soft.   Musculoskeletal:      Cervical back: Normal range of motion and neck supple. Skin:     General: Skin is warm and dry.      Findings: No rash.   Neurological:      Mental Status: Alert.       Procedures    Lab Review:         MDM    #1 coronary artery disease  Patient has nonobstructive disease in the past about 30 to 40% although 3 arteries and has normal LV function is currently stable without any angina #2 COPD  Patient has severe COPD and uses oxygen followed by the pulmonologist.  3.  Hyperlipidemia  Patient on atorvastatin 20 mg once a day and the lipid levels are followed by primary care doctor  4.  Hypertension  Patient blood pressure currently stable on carvedilol 12.5 mg twice daily    Patient's previous medical records, labs, and EKG were reviewed and discussed with the patient at today's visit.

## 2025-08-08 ENCOUNTER — TRANSCRIBE ORDERS (OUTPATIENT)
Dept: ADMINISTRATIVE | Facility: HOSPITAL | Age: 65
End: 2025-08-08
Payer: MEDICAID

## 2025-08-08 DIAGNOSIS — I10 HYPERTENSION, UNSPECIFIED TYPE: Primary | ICD-10-CM

## 2025-08-15 ENCOUNTER — HOSPITAL ENCOUNTER (OUTPATIENT)
Dept: CARDIOLOGY | Facility: HOSPITAL | Age: 65
Discharge: HOME | End: 2025-08-15
Admitting: INTERNAL MEDICINE
Payer: MEDICARE

## 2025-08-15 DIAGNOSIS — I10 HYPERTENSION, UNSPECIFIED TYPE: ICD-10-CM

## 2025-08-15 LAB
AORTIC DIMENSIONLESS INDEX: 0.96 (DI)
AV MEAN PRESS GRAD SYS DOP V1V2: 3 MMHG
AV VMAX SYS DOP: 110 CM/SEC
BH CV ECHO LEFT VENTRICLE GLOBAL LONGITUDINAL STRAIN: -15.3 %
BH CV ECHO MEAS - AO MAX PG: 4.8 MMHG
BH CV ECHO MEAS - AO V2 VTI: 24.6 CM
BH CV ECHO MEAS - AVA(I,D): 2.7 CM2
BH CV ECHO MEAS - EDV(CUBED): 68.9 ML
BH CV ECHO MEAS - EDV(MOD-SP2): 111 ML
BH CV ECHO MEAS - EDV(MOD-SP4): 109 ML
BH CV ECHO MEAS - EF(MOD-SP2): 57.3 %
BH CV ECHO MEAS - EF(MOD-SP4): 53.5 %
BH CV ECHO MEAS - ESV(CUBED): 27 ML
BH CV ECHO MEAS - ESV(MOD-SP2): 47.4 ML
BH CV ECHO MEAS - ESV(MOD-SP4): 50.7 ML
BH CV ECHO MEAS - FS: 26.8 %
BH CV ECHO MEAS - IVS/LVPW: 1.13 CM
BH CV ECHO MEAS - IVSD: 0.9 CM
BH CV ECHO MEAS - LA DIMENSION: 2.7 CM
BH CV ECHO MEAS - LAT PEAK E' VEL: 10.6 CM/SEC
BH CV ECHO MEAS - LV DIASTOLIC VOL/BSA (35-75): 64.2 CM2
BH CV ECHO MEAS - LV MASS(C)D: 105.6 GRAMS
BH CV ECHO MEAS - LV MAX PG: 4.4 MMHG
BH CV ECHO MEAS - LV MEAN PG: 2 MMHG
BH CV ECHO MEAS - LV SYSTOLIC VOL/BSA (12-30): 29.9 CM2
BH CV ECHO MEAS - LV V1 MAX: 105 CM/SEC
BH CV ECHO MEAS - LV V1 VTI: 23.7 CM
BH CV ECHO MEAS - LVIDD: 4.1 CM
BH CV ECHO MEAS - LVIDS: 3 CM
BH CV ECHO MEAS - LVOT AREA: 2.8 CM2
BH CV ECHO MEAS - LVOT DIAM: 1.9 CM
BH CV ECHO MEAS - LVPWD: 0.8 CM
BH CV ECHO MEAS - MED PEAK E' VEL: 7 CM/SEC
BH CV ECHO MEAS - MR MAX PG: 42.8 MMHG
BH CV ECHO MEAS - MR MAX VEL: 327 CM/SEC
BH CV ECHO MEAS - MV A MAX VEL: 90.2 CM/SEC
BH CV ECHO MEAS - MV DEC SLOPE: 325 CM/SEC2
BH CV ECHO MEAS - MV DEC TIME: 0.2 SEC
BH CV ECHO MEAS - MV E MAX VEL: 69.7 CM/SEC
BH CV ECHO MEAS - MV E/A: 0.77
BH CV ECHO MEAS - MV MAX PG: 3.1 MMHG
BH CV ECHO MEAS - MV MEAN PG: 2 MMHG
BH CV ECHO MEAS - MV P1/2T: 63.6 MSEC
BH CV ECHO MEAS - MV V2 VTI: 27.1 CM
BH CV ECHO MEAS - MVA(P1/2T): 3.5 CM2
BH CV ECHO MEAS - MVA(VTI): 2.48 CM2
BH CV ECHO MEAS - PA ACC TIME: 0.16 SEC
BH CV ECHO MEAS - PA V2 MAX: 99.6 CM/SEC
BH CV ECHO MEAS - PULM A REVS DUR: 0.12 SEC
BH CV ECHO MEAS - PULM A REVS VEL: 21.8 CM/SEC
BH CV ECHO MEAS - PULM DIAS VEL: 49.3 CM/SEC
BH CV ECHO MEAS - PULM S/D: 1.22
BH CV ECHO MEAS - PULM SYS VEL: 59.9 CM/SEC
BH CV ECHO MEAS - RAP SYSTOLE: 3 MMHG
BH CV ECHO MEAS - RV MAX PG: 2.12 MMHG
BH CV ECHO MEAS - RV V1 MAX: 72.6 CM/SEC
BH CV ECHO MEAS - RV V1 VTI: 15.3 CM
BH CV ECHO MEAS - RVSP: 22.7 MMHG
BH CV ECHO MEAS - SV(LVOT): 67.2 ML
BH CV ECHO MEAS - SV(MOD-SP2): 63.6 ML
BH CV ECHO MEAS - SV(MOD-SP4): 58.3 ML
BH CV ECHO MEAS - SVI(LVOT): 39.6 ML/M2
BH CV ECHO MEAS - SVI(MOD-SP2): 37.5 ML/M2
BH CV ECHO MEAS - SVI(MOD-SP4): 34.4 ML/M2
BH CV ECHO MEAS - TAPSE (>1.6): 2.38 CM
BH CV ECHO MEAS - TR MAX PG: 19.7 MMHG
BH CV ECHO MEAS - TR MAX VEL: 222 CM/SEC
BH CV ECHO MEAS RV FREE WALL STRAIN: -18.9 %
BH CV ECHO MEASUREMENTS AVERAGE E/E' RATIO: 7.92
BH CV XLRA - RV BASE: 3.1 CM
BH CV XLRA - RV MID: 2.4 CM
BH CV XLRA - TDI S': 11.9 CM/SEC
IVRT: 98 MS
LEFT ATRIUM VOLUME INDEX: 20.8 ML/M2
LV EF 3D SEGMENTATION: 52 %
LV EF BIPLANE MOD: 54.7 %
SINUS: 2.7 CM

## 2025-08-15 PROCEDURE — 93306 TTE W/DOPPLER COMPLETE: CPT

## 2025-08-15 PROCEDURE — 93356 MYOCRD STRAIN IMG SPCKL TRCK: CPT

## 2025-08-19 ENCOUNTER — HOSPITAL ENCOUNTER (OUTPATIENT)
Dept: RESPIRATORY THERAPY | Facility: HOSPITAL | Age: 65
Discharge: HOME | End: 2025-08-19
Payer: MEDICARE

## 2025-08-19 VITALS — OXYGEN SATURATION: 97 % | RESPIRATION RATE: 16 BRPM | HEART RATE: 78 BPM

## 2025-08-19 DIAGNOSIS — J44.9 CHRONIC OBSTRUCTIVE PULMONARY DISEASE, UNSPECIFIED COPD TYPE: ICD-10-CM

## 2025-08-19 DIAGNOSIS — Z99.81 OXYGEN DEPENDENT: ICD-10-CM

## 2025-08-19 PROCEDURE — 94729 DIFFUSING CAPACITY: CPT

## 2025-08-19 PROCEDURE — 94799 UNLISTED PULMONARY SVC/PX: CPT

## 2025-08-19 PROCEDURE — 94060 EVALUATION OF WHEEZING: CPT

## 2025-08-19 PROCEDURE — A9270 NON-COVERED ITEM OR SERVICE: HCPCS | Performed by: INTERNAL MEDICINE

## 2025-08-19 PROCEDURE — 94664 DEMO&/EVAL PT USE INHALER: CPT

## 2025-08-19 PROCEDURE — 63710000001 ALBUTEROL SULFATE HFA 108 (90 BASE) MCG/ACT AEROSOL SOLUTION 6.7 G INHALER: Performed by: INTERNAL MEDICINE

## 2025-08-19 PROCEDURE — 94727 GAS DIL/WSHOT DETER LNG VOL: CPT

## 2025-08-19 RX ORDER — ALBUTEROL SULFATE 90 UG/1
2 INHALANT RESPIRATORY (INHALATION) ONCE
Status: COMPLETED | OUTPATIENT
Start: 2025-08-19 | End: 2025-08-19

## 2025-08-19 RX ADMIN — ALBUTEROL SULFATE 2 PUFF: 90 AEROSOL, METERED RESPIRATORY (INHALATION) at 14:00

## (undated) DEVICE — CATH DIAG IMPULSE FR4 6F 100CM

## (undated) DEVICE — SKIN PREP TRAY W/CHG: Brand: MEDLINE INDUSTRIES, INC.

## (undated) DEVICE — CATH DIAG IMPULSE FL4 6F 100CM

## (undated) DEVICE — PK TRY HEART CATH 50

## (undated) DEVICE — GW DIAG EMERALD HEPCOAT MOVE JTIP STD .035 3MM 150CM

## (undated) DEVICE — CATH DIAG IMPULSE PIG .056 6F 110CM

## (undated) DEVICE — PINNACLE INTRODUCER SHEATH: Brand: PINNACLE